# Patient Record
Sex: FEMALE | Race: WHITE | URBAN - METROPOLITAN AREA
[De-identification: names, ages, dates, MRNs, and addresses within clinical notes are randomized per-mention and may not be internally consistent; named-entity substitution may affect disease eponyms.]

---

## 2017-01-05 ENCOUNTER — RADIANT APPOINTMENT (OUTPATIENT)
Dept: GENERAL RADIOLOGY | Facility: CLINIC | Age: 42
End: 2017-01-05
Attending: PODIATRIST
Payer: COMMERCIAL

## 2017-01-05 ENCOUNTER — OFFICE VISIT (OUTPATIENT)
Dept: PODIATRY | Facility: CLINIC | Age: 42
End: 2017-01-05
Payer: COMMERCIAL

## 2017-01-05 VITALS — WEIGHT: 169 LBS | BODY MASS INDEX: 30.9 KG/M2

## 2017-01-05 DIAGNOSIS — S82.62XD CLOSED DISP FRACTURE OF LEFT LATERAL MALLEOLUS WITH ROUTINE HEALING: Primary | ICD-10-CM

## 2017-01-05 DIAGNOSIS — M25.572 ACUTE LEFT ANKLE PAIN: ICD-10-CM

## 2017-01-05 DIAGNOSIS — Z98.890 S/P FOOT SURGERY, LEFT: ICD-10-CM

## 2017-01-05 PROCEDURE — 99024 POSTOP FOLLOW-UP VISIT: CPT | Performed by: PODIATRIST

## 2017-01-05 PROCEDURE — 73610 X-RAY EXAM OF ANKLE: CPT | Mod: LT

## 2017-01-05 NOTE — NURSING NOTE
"Chief Complaint   Patient presents with     Surgical Followup     L ankle       Initial Wt 169 lb (76.658 kg)  Breastfeeding? No Estimated body mass index is 30.9 kg/(m^2) as calculated from the following:    Height as of 11/21/16: 5' 2\" (1.575 m).    Weight as of this encounter: 169 lb (76.658 kg).  BP completed using cuff size: NA (Not Taken)  Jaci Castle      "

## 2017-01-06 NOTE — PROGRESS NOTES
Katty returns to the office for reevaluation of the left ankle.  The patient relates following the instructions given at the last visit with noted less pain.  The patient relates overall more  improvement in pain and function of the left ankle.  The patient relates no other problems.    PAST MEDICAL HISTORY:   Past Medical History   Diagnosis Date     Moderate dysplasia of cervix (VIKY II) 2006     LEEP     Retinal detachment 4/2013     s/p trauma; rupture of globe, vitreous hemorrhage, choroidal effusion, hemorrhage anterior chamber of eye       BMI= Body mass index is 30.9 kg/(m^2).    Weight management plan: Patient was referred to their PCP to discuss a diet and exercise plan.    Physical Exam:    General: The patient appears to have a pleasant mental affect.    Lower extremity physical exam:  Neurovascular status is intact with palpable pedal pulses and intact epicritic sensations.  Muscular exam is within normal limits to major muscle groups.  Integument is intact.      One notes decreased edema.  One notes improved range of motion of the ankle joint on the left. No pain with palpation noted over the lateral malleolus.    Radiograph evaluation including weightbearing AP, lateral and medial oblique views of the left anklereveals interval healing with increased trabeculation of the lateral malleolus with hardware intact.    Assessment:      ICD-10-CM    1. Closed disp fracture of left lateral malleolus with routine healing S82.62XD    2. S/P foot surgery, left Z98.890        Plan:  I have explained to Katty about the conditions.  At this time, the patient may resume normal activity with no restrictions. The patient was instructed to return to the office of any problems arise.    Disclaimer: This note consists of symbols derived from keyboarding, dictation and/or voice recognition software. As a result, there may be errors in the script that have gone undetected. Please consider this when interpreting  information found in this chart.       SHERRI Aguilar D.P.M., AMY.PARIS.

## 2017-03-15 DIAGNOSIS — G43.009 MIGRAINE WITHOUT AURA AND WITHOUT STATUS MIGRAINOSUS, NOT INTRACTABLE: ICD-10-CM

## 2017-03-15 RX ORDER — RIZATRIPTAN BENZOATE 5 MG/1
5-10 TABLET ORAL
Qty: 6 TABLET | Refills: 0 | Status: SHIPPED | OUTPATIENT
Start: 2017-03-15 | End: 2017-10-12

## 2017-03-15 NOTE — TELEPHONE ENCOUNTER
Prescription approved per [facility] Refill Protocol.    Lindy Perkins, Pharm.D.  on behalf of [facility] Pharmacist   [email or web address]

## 2017-03-15 NOTE — TELEPHONE ENCOUNTER
Rizatriptan      Last Written Prescription Date: 10/07/2016  Last Fill Quantity: 6, # refills: 0  Last Office Visit with [facility], [facility] or [facility] prescribing provider: 01/05/2017       BP Readings from Last 3 Encounters:   10/11/16 96/73   10/07/16 116/72   10/03/16 112/78     Keely Mcgee [facility]  Float Technician  Fredo Guzmán

## 2017-04-20 ENCOUNTER — OFFICE VISIT (OUTPATIENT)
Dept: FAMILY MEDICINE | Facility: CLINIC | Age: 42
End: 2017-04-20
Payer: COMMERCIAL

## 2017-04-20 VITALS
SYSTOLIC BLOOD PRESSURE: 96 MMHG | BODY MASS INDEX: 29.95 KG/M2 | DIASTOLIC BLOOD PRESSURE: 70 MMHG | WEIGHT: 169 LBS | TEMPERATURE: 98.6 F | HEART RATE: 92 BPM | RESPIRATION RATE: 14 BRPM | HEIGHT: 63 IN

## 2017-04-20 DIAGNOSIS — G89.29 CHRONIC PAIN OF LEFT ANKLE: ICD-10-CM

## 2017-04-20 DIAGNOSIS — Z00.00 ROUTINE GENERAL MEDICAL EXAMINATION AT A HEALTH CARE FACILITY: Primary | ICD-10-CM

## 2017-04-20 DIAGNOSIS — F17.200 TOBACCO USE DISORDER: ICD-10-CM

## 2017-04-20 DIAGNOSIS — E55.9 VITAMIN D DEFICIENCY: ICD-10-CM

## 2017-04-20 DIAGNOSIS — E83.19 IRON EXCESS: ICD-10-CM

## 2017-04-20 DIAGNOSIS — M25.572 CHRONIC PAIN OF LEFT ANKLE: ICD-10-CM

## 2017-04-20 DIAGNOSIS — Z13.6 CARDIOVASCULAR SCREENING; LDL GOAL LESS THAN 160: ICD-10-CM

## 2017-04-20 LAB
ANION GAP SERPL CALCULATED.3IONS-SCNC: 8 MMOL/L (ref 3–14)
BUN SERPL-MCNC: 9 MG/DL (ref 7–30)
CALCIUM SERPL-MCNC: 8.6 MG/DL (ref 8.5–10.1)
CHLORIDE SERPL-SCNC: 108 MMOL/L (ref 94–109)
CHOLEST SERPL-MCNC: 203 MG/DL
CO2 SERPL-SCNC: 23 MMOL/L (ref 20–32)
CREAT SERPL-MCNC: 0.65 MG/DL (ref 0.52–1.04)
DEPRECATED CALCIDIOL+CALCIFEROL SERPL-MC: 21 UG/L (ref 20–75)
FERRITIN SERPL-MCNC: 108 NG/ML (ref 12–150)
GFR SERPL CREATININE-BSD FRML MDRD: NORMAL ML/MIN/1.7M2
GLUCOSE SERPL-MCNC: 96 MG/DL (ref 70–99)
HDLC SERPL-MCNC: 63 MG/DL
LDLC SERPL CALC-MCNC: 120 MG/DL
NONHDLC SERPL-MCNC: 140 MG/DL
POTASSIUM SERPL-SCNC: 3.7 MMOL/L (ref 3.4–5.3)
SODIUM SERPL-SCNC: 139 MMOL/L (ref 133–144)
TRIGL SERPL-MCNC: 102 MG/DL

## 2017-04-20 PROCEDURE — 80048 BASIC METABOLIC PNL TOTAL CA: CPT | Performed by: NURSE PRACTITIONER

## 2017-04-20 PROCEDURE — 36415 COLL VENOUS BLD VENIPUNCTURE: CPT | Performed by: NURSE PRACTITIONER

## 2017-04-20 PROCEDURE — 80061 LIPID PANEL: CPT | Performed by: NURSE PRACTITIONER

## 2017-04-20 PROCEDURE — 99396 PREV VISIT EST AGE 40-64: CPT | Performed by: NURSE PRACTITIONER

## 2017-04-20 PROCEDURE — 82306 VITAMIN D 25 HYDROXY: CPT | Performed by: NURSE PRACTITIONER

## 2017-04-20 PROCEDURE — 82728 ASSAY OF FERRITIN: CPT | Performed by: NURSE PRACTITIONER

## 2017-04-20 PROCEDURE — 99213 OFFICE O/P EST LOW 20 MIN: CPT | Mod: 25 | Performed by: NURSE PRACTITIONER

## 2017-04-20 NOTE — NURSING NOTE
"Chief Complaint   Patient presents with     Physical       Initial BP 96/70 (BP Location: Left arm)  Pulse 92  Temp 98.6  F (37  C) (Tympanic)  Resp 14  Ht 5' 3\" (1.6 m)  Wt 169 lb (76.7 kg)  BMI 29.94 kg/m2 Estimated body mass index is 29.94 kg/(m^2) as calculated from the following:    Height as of this encounter: 5' 3\" (1.6 m).    Weight as of this encounter: 169 lb (76.7 kg).  Medication Reconciliation: complete    "

## 2017-04-20 NOTE — MR AVS SNAPSHOT
After Visit Summary   4/20/2017    Katty Dixon    MRN: [id number]           Patient Information     Date Of Birth          1975        Visit Information        Provider Department      4/20/2017 8:40 AM Juju Burger APRN [facility]        Today's Diagnoses     Routine general medical examination at a health care facility    -  1    Chronic pain of left ankle        Tobacco use disorder        CARDIOVASCULAR SCREENING; LDL GOAL LESS THAN 160        Vitamin D deficiency        Iron excess          Care Instructions      Preventive Health Recommendations  Female Ages 40 to 49    Yearly exam:     See your health care provider every year in order to  1. Review health changes.   2. Discuss preventive care.    3. Review your medicines if your doctor prescribed any.      Get a Pap test every three years (unless you have an abnormal result and your provider advises testing more often).      If you get Pap tests with HPV test, you only need to test every 5 years, unless you have an abnormal result. You do not need a Pap test if your uterus was removed (hysterectomy) and you have not had cancer.      You should be tested each year for STDs (sexually transmitted diseases), if you're at risk.       Ask your doctor if you should have a mammogram.      Have a colonoscopy (test for colon cancer) if someone in your family has had colon cancer or polyps before age 50.       Have a cholesterol test every 5 years.       Have a diabetes test (fasting glucose) after age 45. If you are at risk for diabetes, you should have this test every 3 years.    Shots: Get a flu shot each year. Get a tetanus shot every 10 years.     Nutrition:     Eat at least 5 servings of fruits and vegetables each day.    Eat whole-grain bread, whole-wheat pasta and brown rice instead of white grains and rice.    Talk to your provider about Calcium and Vitamin D.     Lifestyle    Exercise at least 150  minutes a week (an average of 30 minutes a day, 5 days a week). This will help you control your weight and prevent disease.    Limit alcohol to one drink per day.    No smoking.     Wear sunscreen to prevent skin cancer.    See your dentist every six months for an exam and cleaning.    The 10-year ASCVD risk score (Blanca SAUCEDA Jr, et al., 2013) is: 1.7%    Values used to calculate the score:      Age: 41 years      Sex: Female      Is Non- : No      Diabetic: No      Tobacco smoker: Yes      Systolic Blood Pressure: 96 mmHg      Is BP treated: No      HDL Cholesterol: 47 mg/dL      Total Cholesterol: 189 mg/dL     Smoke stopping.   You need to set the goal of smoke stopping.  Write your goal on a piece of paper,   I am GOING to quit smoking.  And then  At least 3 positive reasons why you are going to quit smoking.    Place this on the mirror in your bathroom and read it the first thing in the AM and the last thing before bed.  Have your goal posted in the kitchen or where ever you need.  Now read it, say it to yourself at least 8 times per day for  8 weeks.    After a few weeks your brain will start to believe this and it will force you to make the decision to smoke or not to smoke.   Your brain can't remember both  I am going to smoke and I am NOT going to smoke... You choose which one you want to do.     Now, on the 2nd piece of paper write down your rewards for no smoking for 1,2,3,4 weeks,  2,3,4,5,6,8,10 12 months.  Pick someone to give you your rewards so you are accountable to someone and they can also be your cheerleader, ( not a nagger).      Doing this should give you 80 % chance of obtaining your goal !!!     Good luck,      Please get an over the counter Vitamin D supplement of 2000 units. Take 2000- 4000 units during the fall/winter months and 1000 units during the spring/summer              Follow-ups after your visit        Additional Services     PODIATRY/FOOT & ANKLE SURGERY  REFERRAL       Your provider has referred you to: [facility]: [facility] [place] [phone]   [email or web address]    Please be aware that coverage of these services is subject to the terms and limitations of your health insurance plan.  Call member services at your health plan with any benefit or coverage questions.      Please bring the following to your appointment:  >>   Any x-rays, CTs or MRIs which have been performed.  Contact the facility where they were done to arrange for  prior to your scheduled appointment.    >>   List of current medications   >>   This referral request   >>   Any documents/labs given to you for this referral                  Who to contact     Normal or non-critical lab and imaging results will be communicated to you by [employer], letter or phone within 4 business days after the clinic has received the results. If you do not hear from us within 7 days, please contact the clinic through [employer] or phone. If you have a critical or abnormal lab result, we will notify you by phone as soon as possible.  Submit refill requests through [employer] or call your pharmacy and they will forward the refill request to us. Please allow 3 business days for your refill to be completed.          If you need to speak with a  for additional information , please call: [phone]           Additional Information About Your Visit        [employer] Information     [employer] gives you secure access to your electronic health record. If you see a primary care provider, you can also send messages to your care team and make appointments. If you have questions, please call your primary care clinic.  If you do not have a primary care provider, please call [phone] and they will assist you.        Care EveryWhere ID     This is your Care EveryWhere ID. This could be used by other organizations to access your [place] medical records  [id number]        Your Vitals  "Were     Pulse Temperature Respirations Height BMI (Body Mass Index)       92 98.6  F (37  C) (Tympanic) 14 5' 3\" (1.6 m) 29.94 kg/m2        Blood Pressure from Last 3 Encounters:   04/20/17 96/70   10/11/16 96/73   10/07/16 116/72    Weight from Last 3 Encounters:   04/20/17 169 lb (76.7 kg)   01/05/17 169 lb (76.7 kg)   11/21/16 163 lb (73.9 kg)              We Performed the Following     Basic metabolic panel  (Ca, Cl, CO2, Creat, Gluc, K, Na, BUN)     Ferritin     Lipid Profile with reflex to direct LDL     PODIATRY/FOOT & ANKLE SURGERY REFERRAL     Vitamin D Deficiency          Today's Medication Changes          These changes are accurate as of: 4/20/17  9:50 AM.  If you have any questions, ask your nurse or doctor.               Start taking these medicines.        Dose/Directions    nicotine 7 MG/24HR 24 hr patch   Commonly known as:  NICODERM CQ   Used for:  Tobacco use disorder        Dose:  1 patch   Place 1 patch onto the skin every 24 hours   Quantity:  30 patch   Refills:  2            Where to get your medicines      These medications were sent to [facility] [address] [address]     Phone:  [phone]     nicotine 7 MG/24HR 24 hr patch                Primary Care Provider Office Phone # Fax #    [facility]ANDER [facility] [phone]       [address] [address]        Thank you!     Thank you for choosing [facility]  for your care. Our goal is always to provide you with excellent care. Hearing back from our patients is one way we can continue to improve our services. Please take a few minutes to complete the written survey that you may receive in the mail after your visit with us. Thank you!             Your Updated Medication List - Protect others around you: Learn how to safely use, store and throw away your medicines at www.disposemymeds.org.    "       This list is accurate as of: 4/20/17  9:50 AM.  Always use your most recent med list.                   Brand Name Dispense Instructions for use    cetirizine 10 MG tablet    ZYRTEC    30 tablet    Take 1 tablet by mouth daily.       fluticasone 50 MCG/ACT spray    FLONASE     Spray 1 spray into both nostrils daily as needed for rhinitis or allergies       hydrOXYzine 25 MG tablet    ATARAX    60 tablet    Take 1 tablet (25 mg) by mouth every 6 hours as needed for itching (and nausea)       ibuprofen 200 MG tablet    ADVIL/MOTRIN     400 mg by mouth every four hours as needed       nicotine 7 MG/24HR 24 hr patch    NICODERM CQ    30 patch    Place 1 patch onto the skin every 24 hours       * order for DME     1 Units    Knee Walker Length of use: Three months       * order for DME     1 Units    Knee Walker Length of use: Three months       * order for DME     1 Device    CAM walker regular       rizatriptan 5 MG tablet    MAXALT    6 tablet    Take 1-2 tablets (5-10 mg) by mouth at onset of headache for migraine May repeat dose in 2 hours.  Do not exceed 30 mg in 24 hours       VITAMIN C PO      Take 1 tablet by mouth daily       vitamin D 1000 UNITS capsule      Take 1 capsule by mouth daily 1-2 doses per day       * Notice:  This list has 3 medication(s) that are the same as other medications prescribed for you. Read the directions carefully, and ask your doctor or other care provider to review them with you.

## 2017-04-20 NOTE — PATIENT INSTRUCTIONS
Preventive Health Recommendations  Female Ages 40 to 49    Yearly exam:     See your health care provider every year in order to  1. Review health changes.   2. Discuss preventive care.    3. Review your medicines if your doctor prescribed any.      Get a Pap test every three years (unless you have an abnormal result and your provider advises testing more often).      If you get Pap tests with HPV test, you only need to test every 5 years, unless you have an abnormal result. You do not need a Pap test if your uterus was removed (hysterectomy) and you have not had cancer.      You should be tested each year for STDs (sexually transmitted diseases), if you're at risk.       Ask your doctor if you should have a mammogram.      Have a colonoscopy (test for colon cancer) if someone in your family has had colon cancer or polyps before age 50.       Have a cholesterol test every 5 years.       Have a diabetes test (fasting glucose) after age 45. If you are at risk for diabetes, you should have this test every 3 years.    Shots: Get a flu shot each year. Get a tetanus shot every 10 years.     Nutrition:     Eat at least 5 servings of fruits and vegetables each day.    Eat whole-grain bread, whole-wheat pasta and brown rice instead of white grains and rice.    Talk to your provider about Calcium and Vitamin D.     Lifestyle    Exercise at least 150 minutes a week (an average of 30 minutes a day, 5 days a week). This will help you control your weight and prevent disease.    Limit alcohol to one drink per day.    No smoking.     Wear sunscreen to prevent skin cancer.    See your dentist every six months for an exam and cleaning.    The 10-year ASCVD risk score (Hoagland KOMAL Jr, et al., 2013) is: 1.7%    Values used to calculate the score:      Age: 41 years      Sex: Female      Is Non- : No      Diabetic: No      Tobacco smoker: Yes      Systolic Blood Pressure: 96 mmHg      Is BP treated: No      HDL  Cholesterol: 47 mg/dL      Total Cholesterol: 189 mg/dL     Smoke stopping.   You need to set the goal of smoke stopping.  Write your goal on a piece of paper,   I am GOING to quit smoking.  And then  At least 3 positive reasons why you are going to quit smoking.    Place this on the mirror in your bathroom and read it the first thing in the AM and the last thing before bed.  Have your goal posted in the kitchen or where ever you need.  Now read it, say it to yourself at least 8 times per day for  8 weeks.    After a few weeks your brain will start to believe this and it will force you to make the decision to smoke or not to smoke.   Your brain can't remember both  I am going to smoke and I am NOT going to smoke... You choose which one you want to do.     Now, on the 2nd piece of paper write down your rewards for no smoking for 1,2,3,4 weeks,  2,3,4,5,6,8,10 12 months.  Pick someone to give you your rewards so you are accountable to someone and they can also be your cheerleader, ( not a nagger).      Doing this should give you 80 % chance of obtaining your goal !!!     Good luck,      Please get an over the counter Vitamin D supplement of 2000 units. Take 2000- 4000 units during the fall/winter months and 1000 units during the spring/summer

## 2017-04-20 NOTE — PROGRESS NOTES
SUBJECTIVE:     CC: Katty Dixon is an 41 year old woman who presents for preventive health visit.     Healthy Habits:    Do you get at least three servings of calcium containing foods daily (dairy, green leafy vegetables, etc.)? no, taking calcium and/or vitamin D supplement: no    Amount of exercise or daily activities, outside of work: 0 day(s) per week    Problems taking medications regularly No    Medication side effects: No    Have you had an eye exam in the past two years? yes    Do you see a dentist twice per year? yes    Do you have sleep apnea, excessive snoring or daytime drowsiness?no    -Smoking Cessation; alternatives to Chantex  -Pt is fasting   -Paper work for FMLA     Does have Migraine headaches. Headaches increased after ankle fracture .   still having left ankle pain  Did have increase after braking her ankle .  Do to the pain ,  Being sedentary ,  Stress.  . Is now doing better,   Did have the surgery Oct  11, 2016.  Is still able to to feel the screws. And the ankle will still give out on her occasionally.  Can't  Lay the with left ankle touching the bed.     Today's PHQ-2 Score:   PHQ-2 ( 1999 Pf[employer]) 4/20/2017 4/21/2016   Q1: Little interest or pleasure in doing things 0 0   Q2: Feeling down, depressed or hopeless 0 0   PHQ-2 Score 0 0       Abuse: Current or Past(Physical, Sexual or Emotional)- No  Do you feel safe in your environment - Yes    Social History   Substance Use Topics     Smoking status: Current Every Day Smoker     Packs/day: 0.50     Years: 18.00     Types: Cigarettes     Smokeless tobacco: Never Used      Comment: working on quitting     Alcohol use Yes      Comment: 2-3 times per week     The patient does not drink >3 drinks per day nor >7 drinks per week.    Recent Labs   Lab Test  04/21/16   1215  04/21/15   1415  01/08/14   1218   CHOL  189  171  184   HDL  47*  47*  36*   LDL  130*  114  131*   TRIG  58  51  85   CHOLHDLRATIO   --   3.6  5.0   NHDL  142*   --     --        Reviewed orders with patient.  Reviewed health maintenance and updated orders accordingly - Yes    Mammo Decision Support:  Patient over age 50, mutual decision to screen reflected in health maintenance.    Pertinent mammograms are reviewed under the imaging tab.  History of abnormal Pap smear: NO - age 30-65 PAP every 5 years with negative HPV co-testing recommended    Reviewed and updated as needed this visit by clinical staff  Meds         Reviewed and updated as needed this visit by Provide    ROS:  C: NEGATIVE for fever, chills, change in weight  INTEGUMENTARY/SKIN: NEGATIVE for worrisome rashes, moles or lesions and POSITIVE for psoriasis that is improved with Gold Bond lotion   EYES: NEGATIVE for vision changes or irritation and POSITIVE for corrected vision and current with eye exam   ENT: NEGATIVE for ear, mouth and throat problems and current with dentist   R: NEGATIVE for significant cough or SOB  B: NEGATIVE for masses, tenderness or discharge  CV: NEGATIVE for chest pain, palpitations or peripheral edema  GI: NEGATIVE for nausea, abdominal pain, heartburn, or change in bowel habits   female: no unusual urinary symptoms, no unusual vaginal symptoms and no periods   MUSCULOSKELETAL:NEGATIVE for significant arthralgias or myalgia and POSITIVE  for joint pain left ankle pain continues after fracture and pinning.  Later left ankle is still sore,  Will give out   N: NEGATIVE for weakness, dizziness or paresthesias  NEURO: POSITIVE for HX headaches-migraine and has had slight increase do to inactivity   E: NEGATIVE for temperature intolerance, skin/hair changes  H: NEGATIVE for bleeding problems  PSYCHIATRIC: NEGATIVE for changes in mood or affect    Labs reviewed in EPIC  BP Readings from Last 3 Encounters:   04/20/17 96/70   10/11/16 96/73   10/07/16 116/72    Wt Readings from Last 3 Encounters:   04/20/17 169 lb (76.7 kg)   01/05/17 169 lb (76.7 kg)   11/21/16 163 lb (73.9 kg)                   Patient Active Problem List   Diagnosis     Tobacco use disorder     Hypertrophic and atrophic condition of skin     Sinus tarsi syndrome     Pes planovalgus     CARDIOVASCULAR SCREENING; LDL GOAL LESS THAN 160     24 hour contact handout given     Seasonal allergies     Contraception management     Vitamin D deficiency     Migraine     Tension headache     Iron excess     Traumatic blindness of right eye, sequela     Closed displaced fracture of lateral malleolus of left fibula     Ankle pain, left     Past Surgical History:   Procedure Laterality Date     INSERT INTRAUTERINE DEVICE  7/30/2007    out in  7/2012     LEEP TX, CERVICAL  6/2006    VIKY 2     OPEN REDUCTION INTERNAL FIXATION ANKLE Left 10/11/2016    Procedure: OPEN REDUCTION INTERNAL FIXATION ANKLE;  Surgeon: Krunal Aguilar DPM;  Location: [facility]     REPAIR LACERATION CORNEA  4/28/13    right       Social History   Substance Use Topics     Smoking status: Current Every Day Smoker     Packs/day: 0.50     Years: 18.00     Types: Cigarettes     Smokeless tobacco: Never Used      Comment: working on quitting     Alcohol use Yes      Comment: 2-3 times per week     Family History   Problem Relation Age of Onset     Depression Father      CANCER Maternal Grandfather      lung     CANCER Paternal Grandmother      lung     C.A.D. No family hx of      DIABETES No family hx of      Hypertension No family hx of      CEREBROVASCULAR DISEASE No family hx of      Breast Cancer No family hx of      Cancer - colorectal No family hx of          Current Outpatient Prescriptions   Medication Sig Dispense Refill     nicotine (NICODERM CQ) 7 MG/24HR 24 hr patch Place 1 patch onto the skin every 24 hours 30 patch 2     rizatriptan (MAXALT) 5 MG tablet Take 1-2 tablets (5-10 mg) by mouth at onset of headache for migraine May repeat dose in 2 hours.  Do not exceed 30 mg in 24 hours 6 tablet 0     fluticasone (FLONASE) 50 MCG/ACT nasal spray Spray 1 spray into both  "nostrils daily as needed for rhinitis or allergies       order for DME Knee Walker  Length of use: Three months 1 Units 0     order for DME CAM walker regular 1 Device 0     hydrOXYzine (ATARAX) 25 MG tablet Take 1 tablet (25 mg) by mouth every 6 hours as needed for itching (and nausea) 60 tablet 0     order for DME Knee Walker  Length of use: Three months 1 Units 0     Ascorbic Acid (VITAMIN C PO) Take 1 tablet by mouth daily        Cholecalciferol (VITAMIN D) 1000 UNITS capsule Take 1 capsule by mouth daily 1-2 doses per day       cetirizine (ZYRTEC) 10 MG tablet Take 1 tablet by mouth daily. 30 tablet 5     IBUPROFEN 200 MG OR TABS 400 mg by mouth every four hours as needed       Allergies   Allergen Reactions     Amoxicillin GI Disturbance     Biaxin [Clarithromycin] GI Disturbance     Hydromorphone GI Disturbance     severe vomiting     OBJECTIVE:     BP 96/70 (BP Location: Left arm)  Pulse 92  Temp 98.6  F (37  C) (Tympanic)  Resp 14  Ht 5' 3\" (1.6 m)  Wt 169 lb (76.7 kg)  BMI 29.94 kg/m2  EXAM:  GENERAL: healthy, alert and no distress  EYES: Eyes grossly normal to inspection, PERRL and conjunctivae and sclerae normal  HENT: ear canals and TM's normal, nose and mouth without ulcers or lesions  NECK: no adenopathy, no asymmetry, masses, or scars and thyroid normal to palpation  RESP: lungs clear to auscultation - no rales, rhonchi or wheezes  BREAST: normal without masses, tenderness or nipple discharge and no palpable axillary masses or adenopathy  CV: regular rate and rhythm, normal S1 S2, no S3 or S4, no murmur, click or rub, no peripheral edema and peripheral pulses strong  ABDOMEN: soft, nontender, no hepatosplenomegaly, no masses and bowel sounds normal   (female): deferred  MS: left ankle   Does have limited ROM with flexion and rotation. Lateral side movement    Does have normal flexion and medially l  SKIN: no suspicious lesions or rashes    NEURO: Normal strength and tone, mentation intact and " speech normal  PSYCH: mentation appears normal, affect normal/bright  LYMPH: no cervical, supraclavicular, axillary, or inguinal adenopathy    ASSESSMENT/PLAN:      ICD-10-CM    1. Routine general medical examination at a health care facility Z00.00 Basic metabolic panel  (Ca, Cl, CO2, Creat, Gluc, K, Na, BUN)     Lipid Profile with reflex to direct LDL   2. Chronic pain of left ankle M25.572 PODIATRY/FOOT & ANKLE SURGERY REFERRAL    G89.29    3. Tobacco use disorder F17.200 nicotine (NICODERM CQ) 7 MG/24HR 24 hr patch   4. CARDIOVASCULAR SCREENING; LDL GOAL LESS THAN 160 Z13.6 Basic metabolic panel  (Ca, Cl, CO2, Creat, Gluc, K, Na, BUN)     Lipid Profile with reflex to direct LDL   5. Vitamin D deficiency E55.9 Vitamin D Deficiency   6. Iron excess E83.19 Ferritin       Patient Instructions     Preventive Health Recommendations  Female Ages 40 to 49    Yearly exam:     See your health care provider every year in order to  1. Review health changes.   2. Discuss preventive care.    3. Review your medicines if your doctor prescribed any.      Get a Pap test every three years (unless you have an abnormal result and your provider advises testing more often).      If you get Pap tests with HPV test, you only need to test every 5 years, unless you have an abnormal result. You do not need a Pap test if your uterus was removed (hysterectomy) and you have not had cancer.      You should be tested each year for STDs (sexually transmitted diseases), if you're at risk.       Ask your doctor if you should have a mammogram.      Have a colonoscopy (test for colon cancer) if someone in your family has had colon cancer or polyps before age 50.       Have a cholesterol test every 5 years.       Have a diabetes test (fasting glucose) after age 45. If you are at risk for diabetes, you should have this test every 3 years.    Shots: Get a flu shot each year. Get a tetanus shot every 10 years.     Nutrition:     Eat at least 5 servings  of fruits and vegetables each day.    Eat whole-grain bread, whole-wheat pasta and brown rice instead of white grains and rice.    Talk to your provider about Calcium and Vitamin D.     Lifestyle    Exercise at least 150 minutes a week (an average of 30 minutes a day, 5 days a week). This will help you control your weight and prevent disease.    Limit alcohol to one drink per day.    No smoking.     Wear sunscreen to prevent skin cancer.    See your dentist every six months for an exam and cleaning.    The 10-year ASCVD risk score ([place] KOMAL Jr, et al., 2013) is: 1.7%    Values used to calculate the score:      Age: 41 years      Sex: Female      Is Non- : No      Diabetic: No      Tobacco smoker: Yes      Systolic Blood Pressure: 96 mmHg      Is BP treated: No      HDL Cholesterol: 47 mg/dL      Total Cholesterol: 189 mg/dL     Smoke stopping.   You need to set the goal of smoke stopping.  Write your goal on a piece of paper,   I am GOING to quit smoking.  And then  At least 3 positive reasons why you are going to quit smoking.    Place this on the mirror in your bathroom and read it the first thing in the AM and the last thing before bed.  Have your goal posted in the kitchen or where ever you need.  Now read it, say it to yourself at least 8 times per day for  8 weeks.    After a few weeks your brain will start to believe this and it will force you to make the decision to smoke or not to smoke.   Your brain can't remember both  I am going to smoke and I am NOT going to smoke... You choose which one you want to do.     Now, on the 2nd piece of paper write down your rewards for no smoking for 1,2,3,4 weeks,  2,3,4,5,6,8,10 12 months.  Pick someone to give you your rewards so you are accountable to someone and they can also be your cheerleader, ( not a nagger).      Doing this should give you 80 % chance of obtaining your goal !!!     Good luck,      Please get an over the counter Vitamin D  supplement of 2000 units. Take 2000- 4000 units during the fall/winter months and 1000 units during the spring/summer               Please get an over the counter Vitamin D supplement of 2000 units. Take 2000- 4000 units during the fall/winter months and 1000 units during the spring/summer  You need to set the goal of smoke stopping.   Please get an over the counter Vitamin D supplement of 2000 units. Take 2000- 4000 units during the fall/winter months and 1000 units during the spring/summer  Please get an over the counter Vitamin D supplement of 2000 units. Take 2000- 4000 units during the fall/winter months and 1000 units during the spring/summer  Write your goal on a piece of paper,   I am GOING to quit smoking.  And then  At least 3 positive reasons why you are going to quit smoking.    Place this on the mirror in your bathroom and read it the first thing in the AM and the last thing before bed.  Have your goal posted in the kitchen or where ever you need.  Now read it, say it to yourself at least 8 times per day for  8 weeks.    After a few weeks your brain will start to believe this and it will force you to make the decision to smoke or not to smoke.   Your brain can't remember both  I am going to smoke and I am NOT going to smoke... You choose which one you want to do.     Now, on the 2nd piece of paper write down your rewards for no smoking for 1,2,3,4 weeks,  2,3,4,5,6,8,10 12 months.  Pick someone to give you your rewards so you are accountable to someone and they can also be your cheerleader, ( not a nagger).      Doing this should give you 80 % chance of obtaining your goal !!!     Good luck,      Please get an over the counter Vitamin D supplement of 2000 units. Take 2000- 4000 units during the fall/winter months and 1000 units during the spring/summer                  COUNSELING:   Reviewed preventive health counseling, as reflected in patient instructions       Regular exercise       Healthy  "diet/nutrition         reports that she has been smoking Cigarettes.  She has a 9.00 pack-year smoking history. She has never used smokeless tobacco.  Tobacco Cessation Action Plan: Self help information given to patient  Estimated body mass index is 29.94 kg/(m^2) as calculated from the following:    Height as of this encounter: 5' 3\" (1.6 m).    Weight as of this encounter: 169 lb (76.7 kg).   Weight management plan: Discussed healthy diet and exercise guidelines and patient will follow up in 1 month in clinic to re-evaluate.    Counseling Resources:  ATP IV Guidelines  Pooled Cohorts Equation Calculator  Breast Cancer Risk Calculator  FRAX Risk Assessment  ICSI Preventive Guidelines  Dietary Guidelines for Americans, 2010  USDA's MyPlate  ASA Prophylaxis  Lung CA Screening    [facility] ROCA NP, APRN [facility]  "

## 2017-04-21 NOTE — PROGRESS NOTES
Katty,     I am helping to cover some of Juju's results since she is out of the office.     Your Vit D level is on the low end of normal. You need to increase the Vit D 3 to 3000 units daily and plan to recheck your Vitamin D level again in 3 months.    Please call or email with any additional questions or concerns  ZANDER Duarte CNP

## 2017-08-23 ENCOUNTER — HOSPITAL ENCOUNTER (EMERGENCY)
Facility: CLINIC | Age: 42
Discharge: HOME | End: 2017-08-23
Attending: PHYSICIAN ASSISTANT | Admitting: PHYSICIAN ASSISTANT
Payer: COMMERCIAL

## 2017-08-23 ENCOUNTER — APPOINTMENT (OUTPATIENT)
Dept: GENERAL RADIOLOGY | Facility: CLINIC | Age: 42
End: 2017-08-23
Attending: PHYSICIAN ASSISTANT
Payer: COMMERCIAL

## 2017-08-23 VITALS
DIASTOLIC BLOOD PRESSURE: 78 MMHG | BODY MASS INDEX: 30.36 KG/M2 | TEMPERATURE: 98.4 F | OXYGEN SATURATION: 98 % | SYSTOLIC BLOOD PRESSURE: 113 MMHG | HEIGHT: 62 IN | RESPIRATION RATE: 16 BRPM | WEIGHT: 165 LBS

## 2017-08-23 DIAGNOSIS — S40.022A CONTUSION OF LEFT ARM, INITIAL ENCOUNTER: ICD-10-CM
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
## 2017-08-23 DIAGNOSIS — S16.1XXA STRAIN OF NECK MUSCLE, INITIAL ENCOUNTER: ICD-10-CM

## 2017-08-23 PROCEDURE — 99213 OFFICE O/P EST LOW 20 MIN: CPT

## 2017-08-23 PROCEDURE — 73060 X-RAY EXAM OF HUMERUS: CPT | Mod: LT

## 2017-08-23 PROCEDURE — 99214 OFFICE O/P EST MOD 30 MIN: CPT | Performed by: PHYSICIAN ASSISTANT

## 2017-08-23 RX ORDER — HYDROCODONE BITARTRATE AND ACETAMINOPHEN 5; 325 MG/1; MG/1
1-2 TABLET ORAL EVERY 4 HOURS PRN
Qty: 15 TABLET | Refills: 0 | Status: SHIPPED | OUTPATIENT
Start: 2017-08-23 | End: 2018-07-19

## 2017-08-23 RX ORDER — CYCLOBENZAPRINE HCL 10 MG
5-10 TABLET ORAL 3 TIMES DAILY PRN
Qty: 30 TABLET | Refills: 1 | Status: SHIPPED | OUTPATIENT
Start: 2017-08-23 | End: 2018-04-06

## 2017-08-23 NOTE — DISCHARGE INSTRUCTIONS
Understanding Cervical Strain    There are 7 bones (vertebrae) in the neck that are part of the spine. These are called the cervical spine. Cervical strain is a medical term for neck pain. The neck has several layers of muscles. These are connected with tendons to the cervical spine and other bones. Neck pain is often the result of injury to these muscles and tendons.  Causes of cervical strain  Different types of stress on the neck can damage muscles and tendons (soft tissues) and cause cervical strain. Cervical tissues can be damaged by:    The neck being forced past its normal range of motion, such as in a car accident or sports injury    Constant, low-level stress, such as from poor posture or a poorly set-up workspace  Symptoms of cervical strain  These may include:    Neck pain or stiffness    Pain in the shoulders or upper back    Muscle spasms    Headache, often starting at the base of the neck    Irritability, difficulty concentrating, or sleeplessness  Treatment for cervical strain  This problem often gets better on its own. Treatments aim to reduce pain and inflammation and increase the range of motion of the neck. Possible treatments include:    Over-the-counter or prescription pain medicine. These help relieve pain and inflammation.    Stretching exercises to decrease neck stiffness.    Massage to decrease neck stiffness.    Cold or heat pack. These help reduce pain and swelling.  Call 911  Call emergency services right away if you have any of these:    Face drooping or numbness    Numbness or weakness, especially in the arms or on one side    Slurred speech or difficulty speaking    Blurred vision   When to call your healthcare provider  Call your healthcare provider right away if you have any of these:    Fever of 100.4 F (38 C) or higher, or as directed    Pain or stiffness that gets worse    Symptoms that don t get better, or get worse    Numbness, tingling, weakness or shooting pains into the  arms or legs    New symptoms  Date Last Reviewed: 3/10/2016    8901-0154 [employer]. [address] [place] [address], [place] [address]. All rights reserved. This information is not intended as a substitute for professional medical care. Always follow your healthcare professional's instructions.
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
## 2017-08-23 NOTE — ED AVS SNAPSHOT
[facility] Emergency Department    [address] [facility] [address] [address]    Phone:  [phone]    Fax:  [phone]                                       Katty Dixon   MRN: [id number]    Department:  [facility] Emergency Department   Date of Visit:  8/23/2017           After Visit Summary Signature Page     I have received my discharge instructions, and my questions have been answered. I have discussed any challenges I see with this plan with the nurse or doctor.    ..........................................................................................................................................  Patient/Patient Representative Signature      ..........................................................................................................................................  Patient Representative Print Name and Relationship to Patient    ..................................................               ................................................  Date                                            Time    ..........................................................................................................................................  Reviewed by Signature/Title    ...................................................              ..............................................  Date                                                            Time
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
## 2017-08-30 ASSESSMENT — ENCOUNTER SYMPTOMS
COLOR CHANGE: 0
COUGH: 0
SHORTNESS OF BREATH: 0
LIGHT-HEADEDNESS: 0
NECK PAIN: 1
DIARRHEA: 0
BACK PAIN: 1
NAUSEA: 0
VOMITING: 0
PHOTOPHOBIA: 0
WOUND: 1
HEMATURIA: 0
CHILLS: 0
WEAKNESS: 0
FEVER: 0
NUMBNESS: 0
HEADACHES: 1
DIZZINESS: 0
ABDOMINAL PAIN: 0

## 2017-10-12 ENCOUNTER — OFFICE VISIT (OUTPATIENT)
Dept: FAMILY MEDICINE | Facility: CLINIC | Age: 42
End: 2017-10-12
Payer: COMMERCIAL

## 2017-10-12 VITALS
TEMPERATURE: 98.6 F | BODY MASS INDEX: 31.09 KG/M2 | DIASTOLIC BLOOD PRESSURE: 78 MMHG | HEART RATE: 104 BPM | SYSTOLIC BLOOD PRESSURE: 100 MMHG | WEIGHT: 170 LBS

## 2017-10-12 DIAGNOSIS — R30.0 DYSURIA: Primary | ICD-10-CM

## 2017-10-12 DIAGNOSIS — R82.90 NONSPECIFIC FINDING ON EXAMINATION OF URINE: ICD-10-CM

## 2017-10-12 DIAGNOSIS — N30.01 ACUTE CYSTITIS WITH HEMATURIA: ICD-10-CM

## 2017-10-12 DIAGNOSIS — B37.31 CANDIDIASIS OF VAGINA: ICD-10-CM

## 2017-10-12 DIAGNOSIS — N76.0 BACTERIAL VAGINOSIS: ICD-10-CM

## 2017-10-12 DIAGNOSIS — B96.89 BACTERIAL VAGINOSIS: ICD-10-CM

## 2017-10-12 DIAGNOSIS — G43.009 MIGRAINE WITHOUT AURA AND WITHOUT STATUS MIGRAINOSUS, NOT INTRACTABLE: ICD-10-CM

## 2017-10-12 LAB
ALBUMIN UR-MCNC: 100 MG/DL
APPEARANCE UR: ABNORMAL
BACTERIA #/AREA URNS HPF: ABNORMAL /HPF
BILIRUB UR QL STRIP: ABNORMAL
COLOR UR AUTO: YELLOW
GLUCOSE UR STRIP-MCNC: NEGATIVE MG/DL
HGB UR QL STRIP: ABNORMAL
KETONES UR STRIP-MCNC: ABNORMAL MG/DL
LEUKOCYTE ESTERASE UR QL STRIP: ABNORMAL
NITRATE UR QL: POSITIVE
NON-SQ EPI CELLS #/AREA URNS LPF: ABNORMAL /LPF
PH UR STRIP: 6 PH (ref 5–7)
RBC #/AREA URNS AUTO: ABNORMAL /HPF
SOURCE: ABNORMAL
SP GR UR STRIP: >1.03 (ref 1–1.03)
SPECIMEN SOURCE: ABNORMAL
UROBILINOGEN UR STRIP-ACNC: 1 EU/DL (ref 0.2–1)
WBC #/AREA URNS AUTO: ABNORMAL /HPF
WET PREP SPEC: ABNORMAL

## 2017-10-12 PROCEDURE — 81001 URINALYSIS AUTO W/SCOPE: CPT | Performed by: NURSE PRACTITIONER

## 2017-10-12 PROCEDURE — 87591 N.GONORRHOEAE DNA AMP PROB: CPT | Performed by: NURSE PRACTITIONER

## 2017-10-12 PROCEDURE — 87491 CHLMYD TRACH DNA AMP PROBE: CPT | Performed by: NURSE PRACTITIONER

## 2017-10-12 PROCEDURE — 87210 SMEAR WET MOUNT SALINE/INK: CPT | Performed by: NURSE PRACTITIONER

## 2017-10-12 PROCEDURE — 99213 OFFICE O/P EST LOW 20 MIN: CPT | Performed by: NURSE PRACTITIONER

## 2017-10-12 PROCEDURE — 87186 SC STD MICRODIL/AGAR DIL: CPT | Performed by: NURSE PRACTITIONER

## 2017-10-12 PROCEDURE — 87086 URINE CULTURE/COLONY COUNT: CPT | Performed by: NURSE PRACTITIONER

## 2017-10-12 PROCEDURE — 87088 URINE BACTERIA CULTURE: CPT | Performed by: NURSE PRACTITIONER

## 2017-10-12 RX ORDER — METRONIDAZOLE 500 MG/1
500 TABLET ORAL 2 TIMES DAILY
Qty: 14 TABLET | Refills: 0 | Status: SHIPPED | OUTPATIENT
Start: 2017-10-12 | End: 2018-04-06

## 2017-10-12 RX ORDER — CIPROFLOXACIN 500 MG/1
500 TABLET, FILM COATED ORAL 2 TIMES DAILY
Qty: 14 TABLET | Refills: 0 | Status: SHIPPED | OUTPATIENT
Start: 2017-10-12 | End: 2018-04-06

## 2017-10-12 RX ORDER — RIZATRIPTAN BENZOATE 5 MG/1
5-10 TABLET ORAL
Qty: 6 TABLET | Refills: 0 | Status: SHIPPED | OUTPATIENT
Start: 2017-10-12 | End: 2018-04-17

## 2017-10-12 RX ORDER — FLUCONAZOLE 150 MG/1
150 TABLET ORAL ONCE
Qty: 1 TABLET | Refills: 0 | Status: SHIPPED | OUTPATIENT
Start: 2017-10-12 | End: 2017-10-12

## 2017-10-12 ASSESSMENT — ENCOUNTER SYMPTOMS
SHORTNESS OF BREATH: 0
DYSURIA: 1
LIGHT-HEADEDNESS: 0
FREQUENCY: 1
FATIGUE: 0
SORE THROAT: 0
EYE ITCHING: 0
NAUSEA: 0
HEADACHES: 1
DIAPHORESIS: 0
CHILLS: 0
EYE DISCHARGE: 0
COUGH: 0
CONSTIPATION: 0
DIZZINESS: 0
WHEEZING: 0
DIARRHEA: 0
FEVER: 0
HEMATURIA: 0
RHINORRHEA: 0
SINUS PRESSURE: 0
FLANK PAIN: 0

## 2017-10-12 NOTE — MR AVS SNAPSHOT
After Visit Summary   10/12/2017    Katty Dixon    MRN: [id number]           Patient Information     Date Of Birth          1975        Visit Information        Provider Department      10/12/2017 11:20 AM Krystal Polanco APRN [facility]        Today's Diagnoses     Dysuria    -  1    Migraine without aura and without status migrainosus, not intractable        Bacterial vaginosis        Acute cystitis with hematuria        Candidiasis of vagina           Follow-ups after your visit        Who to contact     Normal or non-critical lab and imaging results will be communicated to you by mobiManagehart, letter or phone within 4 business days after the clinic has received the results. If you do not hear from us within 7 days, please contact the clinic through mobiManagehart or phone. If you have a critical or abnormal lab result, we will notify you by phone as soon as possible.  Submit refill requests through [facility] or call your pharmacy and they will forward the refill request to us. Please allow 3 business days for your refill to be completed.          If you need to speak with a  for additional information , please call: [phone]           Additional Information About Your Visit        MyChart Information     [facility] gives you secure access to your electronic health record. If you see a primary care provider, you can also send messages to your care team and make appointments. If you have questions, please call your primary care clinic.  If you do not have a primary care provider, please call [phone] and they will assist you.        Care EveryWhere ID     This is your Care EveryWhere ID. This could be used by other organizations to access your [place] medical records  [id number]        Your Vitals Were     Pulse Temperature BMI (Body Mass Index)             104 98.6  F (37  C) (Tympanic) 31.09 kg/m2          Blood Pressure from Last 3 Encounters:    10/12/17 100/78   08/23/17 113/78   04/20/17 96/70    Weight from Last 3 Encounters:   10/12/17 170 lb (77.1 kg)   08/23/17 165 lb (74.8 kg)   04/20/17 169 lb (76.7 kg)              We Performed the Following     Chlamydia trachomatis PCR     Neisseria gonorrhoeae PCR     UA reflex to Microscopic and Culture     Urine Microscopic     Wet prep          Today's Medication Changes          These changes are accurate as of: 10/12/17 11:42 AM.  If you have any questions, ask your nurse or doctor.               Start taking these medicines.        Dose/Directions    ciprofloxacin 500 MG tablet   Commonly known as:  CIPRO   Used for:  Acute cystitis with hematuria   Started by:  Krystal Polanco APRN CNP        Dose:  500 mg   Take 1 tablet (500 mg) by mouth 2 times daily   Quantity:  14 tablet   Refills:  0       fluconazole 150 MG tablet   Commonly known as:  DIFLUCAN   Used for:  Candidiasis of vagina   Started by:  Krystal Polanco APRN CNP        Dose:  150 mg   Take 1 tablet (150 mg) by mouth once for 1 dose   Quantity:  1 tablet   Refills:  0       metroNIDAZOLE 500 MG tablet   Commonly known as:  FLAGYL   Used for:  Bacterial vaginosis   Started by:  Krystal Polanco APRN CNP        Dose:  500 mg   Take 1 tablet (500 mg) by mouth 2 times daily   Quantity:  14 tablet   Refills:  0            Where to get your medicines      These medications were sent to [facility] [place]  [place] - [address]  [address] [address]     Phone:  [phone]     ciprofloxacin 500 MG tablet    fluconazole 150 MG tablet    metroNIDAZOLE 500 MG tablet    rizatriptan 5 MG tablet                Primary Care Provider Office Phone # Fax #    ZANDER Sage [phone] [address]  [place] [address]        Equal Access to Services     SARITHA GUILLORY AH: Thee Edmondson, jacek perez, nini vázquez  dian mccormackcherelle medel'aan ah. So [facility] [phone].    ATENCIÓN: Si junaid santiago, tiene a anne disposición servicios gratuitos de asistencia lingüística. Jam al [phone].    We comply with applicable federal civil rights laws and [place] laws. We do not discriminate on the basis of race, color, national origin, age, disability, sex, sexual orientation, or gender identity.            Thank you!     Thank you for choosing [facility]  for your care. Our goal is always to provide you with excellent care. Hearing back from our patients is one way we can continue to improve our services. Please take a few minutes to complete the written survey that you may receive in the mail after your visit with us. Thank you!             Your Updated Medication List - Protect others around you: Learn how to safely use, store and throw away your medicines at www.disposemymeds.org.          This list is accurate as of: 10/12/17 11:42 AM.  Always use your most recent med list.                   Brand Name Dispense Instructions for use Diagnosis    cetirizine 10 MG tablet    ZYRTEC    30 tablet    Take 1 tablet by mouth daily.    Sting of hornets, wasps, and bees as the cause of poisoning and toxic reactions(E905.3)       ciprofloxacin 500 MG tablet    CIPRO    14 tablet    Take 1 tablet (500 mg) by mouth 2 times daily    Acute cystitis with hematuria       cyclobenzaprine 10 MG tablet    FLEXERIL    30 tablet    Take 0.5-1 tablets (5-10 mg) by mouth 3 times daily as needed for muscle spasms        fluconazole 150 MG tablet    DIFLUCAN    1 tablet    Take 1 tablet (150 mg) by mouth once for 1 dose    Candidiasis of vagina       fluticasone 50 MCG/ACT spray    FLONASE     Spray 1 spray into both nostrils daily as needed for rhinitis or allergies        HYDROcodone-acetaminophen 5-325 MG per tablet    NORCO    15 tablet    Take 1-2 tablets by mouth every 4 hours as needed for moderate to severe pain         hydrOXYzine 25 MG tablet    ATARAX    60 tablet    Take 1 tablet (25 mg) by mouth every 6 hours as needed for itching (and nausea)    Closed displaced fracture of lateral malleolus of left fibula, initial encounter       ibuprofen 200 MG tablet    ADVIL/MOTRIN     400 mg by mouth every four hours as needed        metroNIDAZOLE 500 MG tablet    FLAGYL    14 tablet    Take 1 tablet (500 mg) by mouth 2 times daily    Bacterial vaginosis       nicotine 7 MG/24HR 24 hr patch    NICODERM CQ    30 patch    Place 1 patch onto the skin every 24 hours    Tobacco use disorder       rizatriptan 5 MG tablet    MAXALT    6 tablet    Take 1-2 tablets (5-10 mg) by mouth at onset of headache for migraine May repeat dose in 2 hours.  Do not exceed 30 mg in 24 hours    Migraine without aura and without status migrainosus, not intractable       VITAMIN C PO      Take 1 tablet by mouth daily        vitamin D 1000 UNITS capsule      Take 1 capsule by mouth daily 1-2 doses per day
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
## 2017-10-13 LAB
BACTERIA SPEC CULT: ABNORMAL
C TRACH DNA SPEC QL NAA+PROBE: NEGATIVE
N GONORRHOEA DNA SPEC QL NAA+PROBE: NEGATIVE
SPECIMEN SOURCE: ABNORMAL
SPECIMEN SOURCE: NORMAL
SPECIMEN SOURCE: NORMAL

## 2017-10-13 NOTE — PROGRESS NOTES
Katty,    You are negative for sexually transmitted diseases. Please use condoms with every sexual encounter.    Please call or email with any additional questions or concerns  ZANDER Duarte CNP

## 2017-10-16 NOTE — PROGRESS NOTES
Your urine culture is positive. You are on the right antibiotic. Make sure to take the full course of the antibiotic. Please return 3 days after your antibiotic is finished to resubmit a urine sample to make sure that infection is gone.

## 2017-11-21 ENCOUNTER — ALLIED HEALTH/NURSE VISIT (OUTPATIENT)
Dept: NURSING | Facility: CLINIC | Age: 42
End: 2017-11-21
Payer: COMMERCIAL

## 2017-11-21 ENCOUNTER — OFFICE VISIT (OUTPATIENT)
Dept: FAMILY MEDICINE | Facility: CLINIC | Age: 42
End: 2017-11-21
Payer: COMMERCIAL

## 2017-11-21 DIAGNOSIS — N39.0 URINARY TRACT INFECTION WITHOUT HEMATURIA, SITE UNSPECIFIED: Primary | ICD-10-CM

## 2017-11-21 DIAGNOSIS — R30.0 DYSURIA: Primary | ICD-10-CM

## 2017-11-21 LAB
ALBUMIN UR-MCNC: NEGATIVE MG/DL
APPEARANCE UR: ABNORMAL
BACTERIA #/AREA URNS HPF: ABNORMAL /HPF
BILIRUB UR QL STRIP: NEGATIVE
COLOR UR AUTO: YELLOW
GLUCOSE UR STRIP-MCNC: NEGATIVE MG/DL
HGB UR QL STRIP: ABNORMAL
KETONES UR STRIP-MCNC: NEGATIVE MG/DL
LEUKOCYTE ESTERASE UR QL STRIP: ABNORMAL
NITRATE UR QL: NEGATIVE
NON-SQ EPI CELLS #/AREA URNS LPF: ABNORMAL /LPF
PH UR STRIP: 6 PH (ref 5–7)
RBC #/AREA URNS AUTO: ABNORMAL /HPF
SOURCE: ABNORMAL
SP GR UR STRIP: 1.02 (ref 1–1.03)
UROBILINOGEN UR STRIP-ACNC: 1 EU/DL (ref 0.2–1)
WBC #/AREA URNS AUTO: ABNORMAL /HPF

## 2017-11-21 PROCEDURE — 99213 OFFICE O/P EST LOW 20 MIN: CPT | Performed by: FAMILY MEDICINE

## 2017-11-21 PROCEDURE — 81001 URINALYSIS AUTO W/SCOPE: CPT | Performed by: FAMILY MEDICINE

## 2017-11-21 PROCEDURE — 87088 URINE BACTERIA CULTURE: CPT | Performed by: FAMILY MEDICINE

## 2017-11-21 PROCEDURE — 87086 URINE CULTURE/COLONY COUNT: CPT | Performed by: FAMILY MEDICINE

## 2017-11-21 PROCEDURE — 87186 SC STD MICRODIL/AGAR DIL: CPT | Performed by: FAMILY MEDICINE

## 2017-11-21 PROCEDURE — 99207 ZZC NO CHARGE NURSE ONLY: CPT

## 2017-11-21 RX ORDER — CIPROFLOXACIN 500 MG/1
500 TABLET, FILM COATED ORAL 2 TIMES DAILY
Qty: 6 TABLET | Refills: 1 | Status: SHIPPED | OUTPATIENT
Start: 2017-11-21 | End: 2017-11-24

## 2017-11-21 NOTE — MR AVS SNAPSHOT
After Visit Summary   11/21/2017    Katty Dixon    MRN: [id number]           Patient Information     Date Of Birth          1975        Visit Information        Provider Department      11/21/2017 1:00 PM WILLIAM HUSSEIN RN [facility]        Today's Diagnoses     Dysuria    -  1       Follow-ups after your visit        Your next 10 appointments already scheduled     Nov 21, 2017  1:00 PM CST   [facility]    [address] [address]   [phone]              Who to contact     If you have questions or need follow up information about today's clinic visit or your schedule please contact [facility] directly at [phone].  Normal or non-critical lab and imaging results will be communicated to you by MyChart, letter or phone within 4 business days after the clinic has received the results. If you do not hear from us within 7 days, please contact the clinic through MyChart or phone. If you have a critical or abnormal lab result, we will notify you by phone as soon as possible.  Submit refill requests through [facility] or call your pharmacy and they will forward the refill request to us. Please allow 3 business days for your refill to be completed.          Additional Information About Your Visit        MyChart Information     [facility] gives you secure access to your electronic health record. If you see a primary care provider, you can also send messages to your care team and make appointments. If you have questions, please call your primary care clinic.  If you do not have a primary care provider, please call [phone] and they will assist you.        Care EveryWhere ID     This is your Care EveryWhere ID. This could be used by other organizations to access your [place] medical records  [id number]         Blood Pressure from Last 3 Encounters:   10/12/17 100/78   08/23/17  113/78   04/20/17 96/70    Weight from Last 3 Encounters:   10/12/17 170 lb (77.1 kg)   08/23/17 165 lb (74.8 kg)   04/20/17 169 lb (76.7 kg)              Today, you had the following     No orders found for display       Primary Care Provider Office Phone # Fax #    Juju Burger, APRN [facility] [phone] [address] [place] [address]        Equal Access to Services     SARITHA GUILLORY : Hadii aad ku hadasho Soomaali, waaxda luqadaha, qaybta kaalmada adeegyada, waxay idiin hayaan adeeg kharash larenny . So [facility] [phone].    ATENCIÓN: Si habla español, tiene a anne disposición servicios gratuitos de asistencia lingüística. Llame al [phone].    We comply with applicable federal civil rights laws and [place] laws. We do not discriminate on the basis of race, color, national origin, age, disability, sex, sexual orientation, or gender identity.            Thank you!     Thank you for choosing [facility]  for your care. Our goal is always to provide you with excellent care. Hearing back from our patients is one way we can continue to improve our services. Please take a few minutes to complete the written survey that you may receive in the mail after your visit with us. Thank you!             Your Updated Medication List - Protect others around you: Learn how to safely use, store and throw away your medicines at www.disposemymeds.org.          This list is accurate as of: 11/21/17 11:59 AM.  Always use your most recent med list.                   Brand Name Dispense Instructions for use Diagnosis    cetirizine 10 MG tablet    ZYRTEC    30 tablet    Take 1 tablet by mouth daily.    Sting of hornets, wasps, and bees as the cause of poisoning and toxic reactions(E905.3)       ciprofloxacin 500 MG tablet    CIPRO    14 tablet    Take 1 tablet (500 mg) by mouth 2 times daily    Acute cystitis with hematuria       cyclobenzaprine 10 MG tablet    FLEXERIL    30 tablet     Take 0.5-1 tablets (5-10 mg) by mouth 3 times daily as needed for muscle spasms        fluticasone 50 MCG/ACT spray    FLONASE     Spray 1 spray into both nostrils daily as needed for rhinitis or allergies        HYDROcodone-acetaminophen 5-325 MG per tablet    NORCO    15 tablet    Take 1-2 tablets by mouth every 4 hours as needed for moderate to severe pain        hydrOXYzine 25 MG tablet    ATARAX    60 tablet    Take 1 tablet (25 mg) by mouth every 6 hours as needed for itching (and nausea)    Closed displaced fracture of lateral malleolus of left fibula, initial encounter       ibuprofen 200 MG tablet    ADVIL/MOTRIN     400 mg by mouth every four hours as needed        metroNIDAZOLE 500 MG tablet    FLAGYL    14 tablet    Take 1 tablet (500 mg) by mouth 2 times daily    Bacterial vaginosis       nicotine 7 MG/24HR 24 hr patch    NICODERM CQ    30 patch    Place 1 patch onto the skin every 24 hours    Tobacco use disorder       rizatriptan 5 MG tablet    MAXALT    6 tablet    Take 1-2 tablets (5-10 mg) by mouth at onset of headache for migraine May repeat dose in 2 hours.  Do not exceed 30 mg in 24 hours    Migraine without aura and without status migrainosus, not intractable       VITAMIN C PO      Take 1 tablet by mouth daily        vitamin D 1000 UNITS capsule      Take 1 capsule by mouth daily 1-2 doses per day

## 2017-11-21 NOTE — MR AVS SNAPSHOT
After Visit Summary   11/21/2017    Katty Dixon    MRN: [id number]           Patient Information     Date Of Birth          1975        Visit Information        Provider Department      11/21/2017 11:40 AM Katty Gibson MD [facility]        Today's Diagnoses     Urinary tract infection without hematuria, site unspecified    -  1       Follow-ups after your visit        Who to contact     Normal or non-critical lab and imaging results will be communicated to you by Ancerahart, letter or phone within 4 business days after the clinic has received the results. If you do not hear from us within 7 days, please contact the clinic through Ancerahart or phone. If you have a critical or abnormal lab result, we will notify you by phone as soon as possible.  Submit refill requests through Wordy or call your pharmacy and they will forward the refill request to us. Please allow 3 business days for your refill to be completed.          If you need to speak with a  for additional information , please call: [phone]           Additional Information About Your Visit        Wordy Information     Wordy gives you secure access to your electronic health record. If you see a primary care provider, you can also send messages to your care team and make appointments. If you have questions, please call your primary care clinic.  If you do not have a primary care provider, please call [phone] and they will assist you.        Care EveryWhere ID     This is your Care EveryWhere ID. This could be used by other organizations to access your [facility] medical records  [id number]         Blood Pressure from Last 3 Encounters:   10/12/17 100/78   08/23/17 113/78   04/20/17 96/70    Weight from Last 3 Encounters:   10/12/17 170 lb (77.1 kg)   08/23/17 165 lb (74.8 kg)   04/20/17 169 lb (76.7 kg)              We Performed the Following     UA reflex to Microscopic and Culture      Urine Culture Aerobic Bacterial     Urine Microscopic          Today's Medication Changes          These changes are accurate as of: 11/21/17 11:59 PM.  If you have any questions, ask your nurse or doctor.               These medicines have changed or have updated prescriptions.        Dose/Directions    * ciprofloxacin 500 MG tablet   Commonly known as:  CIPRO   This may have changed:  Another medication with the same name was added. Make sure you understand how and when to take each.   Used for:  Acute cystitis with hematuria   Changed by:  Krystal Polanco APRN CNP        Dose:  500 mg   Take 1 tablet (500 mg) by mouth 2 times daily   Quantity:  14 tablet   Refills:  0       * ciprofloxacin 500 MG tablet   Commonly known as:  CIPRO   This may have changed:  You were already taking a medication with the same name, and this prescription was added. Make sure you understand how and when to take each.   Used for:  Urinary tract infection without hematuria, site unspecified   Changed by:  Katty Gibson MD        Dose:  500 mg   Take 1 tablet (500 mg) by mouth 2 times daily for 3 days   Quantity:  6 tablet   Refills:  1       * Notice:  This list has 2 medication(s) that are the same as other medications prescribed for you. Read the directions carefully, and ask your doctor or other care provider to review them with you.         Where to get your medicines      These medications were sent to [facility] [address] [address]     Phone:  [phone]     ciprofloxacin 500 MG tablet                Primary Care Provider Office Phone # Fax #    ZANDER Sage [phone]       [address] [place] [id number]        Equal Access to Services     LD GUILLORY AH: Thee Edmondson, waoneilda luqadaha, qaybta kaalnini rueda. So [facility]  [phone].    ATENCIÓN: Si junaid santiago, tiene a anne disposición servicios gratuitos de asistencia lingüística. Jam vanegas [phone].    We comply with applicable federal civil rights laws and [place] laws. We do not discriminate on the basis of race, color, national origin, age, disability, sex, sexual orientation, or gender identity.            Thank you!     Thank you for choosing [facility]  for your care. Our goal is always to provide you with excellent care. Hearing back from our patients is one way we can continue to improve our services. Please take a few minutes to complete the written survey that you may receive in the mail after your visit with us. Thank you!             Your Updated Medication List - Protect others around you: Learn how to safely use, store and throw away your medicines at www.disposemymeds.org.          This list is accurate as of: 11/21/17 11:59 PM.  Always use your most recent med list.                   Brand Name Dispense Instructions for use Diagnosis    cetirizine 10 MG tablet    ZYRTEC    30 tablet    Take 1 tablet by mouth daily.    Sting of hornets, wasps, and bees as the cause of poisoning and toxic reactions(E905.3)       * ciprofloxacin 500 MG tablet    CIPRO    14 tablet    Take 1 tablet (500 mg) by mouth 2 times daily    Acute cystitis with hematuria       * ciprofloxacin 500 MG tablet    CIPRO    6 tablet    Take 1 tablet (500 mg) by mouth 2 times daily for 3 days    Urinary tract infection without hematuria, site unspecified       cyclobenzaprine 10 MG tablet    FLEXERIL    30 tablet    Take 0.5-1 tablets (5-10 mg) by mouth 3 times daily as needed for muscle spasms        fluticasone 50 MCG/ACT spray    FLONASE     Spray 1 spray into both nostrils daily as needed for rhinitis or allergies        HYDROcodone-acetaminophen 5-325 MG per tablet    NORCO    15 tablet    Take 1-2 tablets by mouth every 4 hours as needed for moderate to severe pain         hydrOXYzine 25 MG tablet    ATARAX    60 tablet    Take 1 tablet (25 mg) by mouth every 6 hours as needed for itching (and nausea)    Closed displaced fracture of lateral malleolus of left fibula, initial encounter       ibuprofen 200 MG tablet    ADVIL/MOTRIN     400 mg by mouth every four hours as needed        metroNIDAZOLE 500 MG tablet    FLAGYL    14 tablet    Take 1 tablet (500 mg) by mouth 2 times daily    Bacterial vaginosis       nicotine 7 MG/24HR 24 hr patch    NICODERM CQ    30 patch    Place 1 patch onto the skin every 24 hours    Tobacco use disorder       rizatriptan 5 MG tablet    MAXALT    6 tablet    Take 1-2 tablets (5-10 mg) by mouth at onset of headache for migraine May repeat dose in 2 hours.  Do not exceed 30 mg in 24 hours    Migraine without aura and without status migrainosus, not intractable       VITAMIN C PO      Take 1 tablet by mouth daily        vitamin D 1000 UNITS capsule      Take 1 capsule by mouth daily 1-2 doses per day        * Notice:  This list has 2 medication(s) that are the same as other medications prescribed for you. Read the directions carefully, and ask your doctor or other care provider to review them with you.

## 2017-11-21 NOTE — PROGRESS NOTES
Patient was at the pharmacy and wanted to see if she could be seen or at least leave a urine sample today. There are no available appointments available. Offered telephone visit, explained costs and she was ok with that.  Romy Montes RN      SUBJECTIVE:   Katty Dixon is a 42 year old female who presents to clinic today for the following health issues:    URINARY TRACT SYMPTOMS  Onset: 2days    Description:   Painful urination (Dysuria): YES  Blood in urine (Hematuria): NO  Delay in urine (Hesitency): YES    Intensity: moderate    Progression of Symptoms:  worsening    Accompanying Signs & Symptoms:  Fever/chills: no   Flank pain YES  Nausea and vomiting: no   Any vaginal symptoms: vaginal odor  Abdominal/Pelvic Pain: no     History:   History of frequent UTI's: YES  History of kidney stones: no   Sexually Active: YES  Possibility of pregnancy: No      Therapies Tried and outcome:  Increase fluid intake and OTC advil or tylenol     Appropriate for telephone visit. Spoke with Dr. Gibson and placed on his schedule for a T-visit. Patient left a urine sample.

## 2017-11-21 NOTE — PROGRESS NOTES
SUBJECTIVE:   Katty Dixon is a 42 year old female who presents to clinic today for the following health issues:      URINARY TRACT SYMPTOMS  Onset: 2 days    Description:   Painful urination (Dysuria): YES  Blood in urine (Hematuria): NO  Delay in urine (Hesitency): YES    Intensity: moderate    Progression of Symptoms:  worsening    Accompanying Signs & Symptoms:  Fever/chills: no   Flank pain YES  Nausea and vomiting: no   Any vaginal symptoms: vaginal odor  Abdominal/Pelvic Pain: no     History:   History of frequent UTI's: YES  History of kidney stones: no   Sexually Active: YES  Possibility of pregnancy: No      Therapies Tried and outcome:  Increase fluid intake and OTC advil or tylenol       ROS:  Constitutional, HEENT, cardiovascular, pulmonary, gi and gu systems are negative, except as otherwise noted.      This document serves as a record of the services and decisions personally performed and made by Katty Gibson MD. It was created on his behalf by Jennifer Phoenix, a trained medical scribe. The creation of this document is based the provider's statements to the medical scribe.  Jennifer Phoenix 12:15 PM November 21, 2017  OBJECTIVE:     There were no vitals taken for this visit.  There is no height or weight on file to calculate BMI.     GENERAL: Healthy, alert and no distress  ABD: Soft, non-tender, no CVA tenderness to percussion  PSYCH: Mentation appears normal, affect normal/bright      Results for orders placed or performed in visit on 11/21/17   UA reflex to Microscopic and Culture   Result Value Ref Range    Color Urine Yellow     Appearance Urine Slightly Cloudy     Glucose Urine Negative NEG^Negative mg/dL    Bilirubin Urine Negative NEG^Negative    Ketones Urine Negative NEG^Negative mg/dL    Specific Gravity Urine 1.020 1.003 - 1.035    Blood Urine Moderate (A) NEG^Negative    pH Urine 6.0 5.0 - 7.0 pH    Protein Albumin Urine Negative NEG^Negative mg/dL    Urobilinogen Urine 1.0 0.2 - 1.0  EU/dL    Nitrite Urine Negative NEG^Negative    Leukocyte Esterase Urine Large (A) NEG^Negative    Source Midstream Urine    Urine Microscopic   Result Value Ref Range    WBC Urine 25-50 (A) OTO2^O - 2 /HPF    RBC Urine 2-5 (A) OTO2^O - 2 /HPF    Squamous Epithelial /LPF Urine Few FEW^Few /LPF    Bacteria Urine Moderate (A) NEG^Negative /HPF       ASSESSMENT/PLAN:     (N39.0) UTI (urinary tract infection)  (primary encounter diagnosis)  Comment: Consistent with bacterial etiology, will treat with antibiotics.  Plan: UA reflex to Microscopic and Culture,         ciprofloxacin (CIPRO) 500 MG tablet, Urine         Microscopic - Follow-up if symptoms worsen or do not improve.    (R82.90) Nonspecific finding on examination of urine  Plan: Urine Culture Aerobic Bacterial      Patient will follow up PRN. Patient instructed to call with any questions or concerns.    There are no Patient Instructions on file for this visit.    The information in this document, created by a scribe for me, accurately reflects the services I personally performed and the decisions made by me. I have reviewed and approved this document for accuracy.  12:15 PM November 21, 2017    Katty Gibson MD  [facility]

## 2017-11-23 LAB
BACTERIA SPEC CULT: ABNORMAL
Lab: ABNORMAL
SPECIMEN SOURCE: ABNORMAL

## 2018-04-06 ENCOUNTER — OFFICE VISIT (OUTPATIENT)
Dept: FAMILY MEDICINE | Facility: CLINIC | Age: 43
End: 2018-04-06
Payer: COMMERCIAL

## 2018-04-06 VITALS
DIASTOLIC BLOOD PRESSURE: 76 MMHG | HEART RATE: 74 BPM | HEIGHT: 63 IN | WEIGHT: 173.2 LBS | BODY MASS INDEX: 30.69 KG/M2 | TEMPERATURE: 98.1 F | SYSTOLIC BLOOD PRESSURE: 118 MMHG | RESPIRATION RATE: 16 BRPM

## 2018-04-06 DIAGNOSIS — T83.32XA INTRAUTERINE CONTRACEPTIVE DEVICE THREADS LOST, INITIAL ENCOUNTER: Primary | ICD-10-CM

## 2018-04-06 LAB — BETA HCG QUAL IFA URINE: NEGATIVE

## 2018-04-06 PROCEDURE — 99213 OFFICE O/P EST LOW 20 MIN: CPT | Performed by: PHYSICIAN ASSISTANT

## 2018-04-06 PROCEDURE — 84703 CHORIONIC GONADOTROPIN ASSAY: CPT | Performed by: PHYSICIAN ASSISTANT

## 2018-04-06 NOTE — NURSING NOTE
"Chief Complaint   Patient presents with     remove and insert new mirena       Initial /76  Pulse 74  Temp 98.1  F (36.7  C) (Tympanic)  Resp 16  Ht 5' 2.75\" (1.594 m)  Wt 173 lb 3.2 oz (78.6 kg)  BMI 30.93 kg/m2 Estimated body mass index is 30.93 kg/(m^2) as calculated from the following:    Height as of this encounter: 5' 2.75\" (1.594 m).    Weight as of this encounter: 173 lb 3.2 oz (78.6 kg).  Medication Reconciliation: complete    "

## 2018-04-06 NOTE — PROGRESS NOTES
Katty Dixon is a 42 year old female who presents today requesting replacement of an intrauterine device.  She currently has the Mirena IUD (placed 9/27/12) and is tolerating it well with no complications.  No unprotected sex in the past month.   The patient meets and is agreeable to the following conditions:   She is parous.   She is not interested in conception in the near future.   She currently is in a stable, monogamous relationship.   There is no previous history of pelvic inflammatory disease.   There is no previous history of ectopic pregnancy.   She is willing to check monthly for the IUD string.   She is at least 8 weeks post-partum.   There is no history of unresolved abnormal uterine bleeding.   There is no history of an unresolved abnormal PAP smear.   She has no history of Ricci's disease or an allergy to copper (for ParaGard).   She has no history of diabetes, AIDS, leukemia, IV drug use or chronic steroid use.   She is willing to return annually for pelvic exams.   She is current on pap smear testing.   She denies the possibility of pregnancy.   The following risks were discussed with the patient:   Possibility of pregnancy and ectopic pregnancy.   Possibility of pelvic inflammatory disease, particularly with new partners.   Risk of uterine perforation or IUD expulsion.   Possibility of difficult removal.   Spotting or heavy bleeding.   Cramping, pain or infection during or after insertion.   The patient was given patient information on the IUD and the patient education brochure from the . The patient has given consent to proceed with placement of the IUD. A written consent form is present in the chart. She wishes to proceed.   PROCEDURE:   Type of IUD: Mirena  She is placed in a dorsal lithotomy potion and a pelvic exam is performed to determine the position of the uterus. The cervix is identified however unable to visualize IUD strins..    Procedure was ended and will check an US  and have her f/u with gyne for replacement of IUD.     Bambi Mac PA-C

## 2018-04-06 NOTE — MR AVS SNAPSHOT
After Visit Summary   4/6/2018    Katty Dixon    MRN: [id number]           Patient Information     Date Of Birth          1975        Visit Information        Provider Department      4/6/2018 11:00 AM Bambi Mac PA-C [facility]        Today's Diagnoses     Encounter for insertion of mirena IUD    -  1    Intrauterine contraceptive device threads lost, initial encounter          Care Instructions    Please call [facility] Scheduling at [phone]  to set up the US and then f/u with a gynecologist for IUD replacement.            Follow-ups after your visit        Additional Services     OB/GYN REFERRAL       Your provider has referred you to:  [facility]: [facility] [phone]   [email or web address]  [facility]: [facility] [phone]   [email or web address]    Please be aware that coverage of these services is subject to the terms and limitations of your health insurance plan.  Call member services at your health plan with any benefit or coverage questions.      Please bring the following with you to your appointment:    (1) Any X-Rays, CTs or MRIs which have been performed.  Contact the facility where they were done to arrange for  prior to your scheduled appointment.   (2) List of current medications   (3) This referral request   (4) Any documents/labs given to you for this referral                  Your next 10 appointments already scheduled     Apr 09, 2018 11:40 AM CDT   SHORT with ZANDER Sage CNP   [facility] ([facility])    [address] [address]   [phone]              Future tests that were ordered for you today     Open Future Orders        Priority Expected Expires Ordered    US Pelvic Complete w Transvaginal Routine  4/6/2019 4/6/2018            Who to contact     Normal  "or non-critical lab and imaging results will be communicated to you by MyChart, letter or phone within 4 business days after the clinic has received the results. If you do not hear from us within 7 days, please contact the clinic through Owtwaret or phone. If you have a critical or abnormal lab result, we will notify you by phone as soon as possible.  Submit refill requests through [employer] or call your pharmacy and they will forward the refill request to us. Please allow 3 business days for your refill to be completed.          If you need to speak with a  for additional information , please call: [phone]           Additional Information About Your Visit        Pure[employer] Solutions Information     [employer] gives you secure access to your electronic health record. If you see a primary care provider, you can also send messages to your care team and make appointments. If you have questions, please call your primary care clinic.  If you do not have a primary care provider, please call [phone] and they will assist you.        Care EveryWhere ID     This is your Care EveryWhere ID. This could be used by other organizations to access your [facility] medical records  [id number]        Your Vitals Were     Pulse Temperature Respirations Height BMI (Body Mass Index)       74 98.1  F (36.7  C) (Tympanic) 16 5' 2.75\" (1.594 m) 30.93 kg/m2        Blood Pressure from Last 3 Encounters:   04/06/18 118/76   10/12/17 100/78   08/23/17 113/78    Weight from Last 3 Encounters:   04/06/18 173 lb 3.2 oz (78.6 kg)   10/12/17 170 lb (77.1 kg)   08/23/17 165 lb (74.8 kg)              We Performed the Following     Beta HCG qual IFA urine     OB/GYN REFERRAL        Primary Care Provider Office Phone # Fax #    ZANDER Sage [facility] [phone] [address] [place] [address]        Equal Access to Services     SARITHA GUILLORY AH: Hadii naga sharma Sosegun, waaxda luqadaha, qaybta kaalmada " nini mastcherelle sandovalaan ah. [facility] [phone].    ATENCIÓN: Si evelinala jack, tiene a anne disposición servicios gratuitos de asistencia lingüística. Jam al [phone].    We comply with applicable federal civil rights laws and [place] laws. We do not discriminate on the basis of race, color, national origin, age, disability, sex, sexual orientation, or gender identity.            Thank you!     Thank you for choosing [facility]  for your care. Our goal is always to provide you with excellent care. Hearing back from our patients is one way we can continue to improve our services. Please take a few minutes to complete the written survey that you may receive in the mail after your visit with us. Thank you!             Your Updated Medication List - Protect others around you: Learn how to safely use, store and throw away your medicines at www.disposemymeds.org.          This list is accurate as of 4/6/18 11:55 AM.  Always use your most recent med list.                   Brand Name Dispense Instructions for use Diagnosis    cetirizine 10 MG tablet    ZYRTEC    30 tablet    Take 1 tablet by mouth daily.    Sting of hornets, wasps, and bees as the cause of poisoning and toxic reactions(E905.3)       fluticasone 50 MCG/ACT spray    FLONASE     Spray 1 spray into both nostrils daily as needed for rhinitis or allergies        HYDROcodone-acetaminophen 5-325 MG per tablet    NORCO    15 tablet    Take 1-2 tablets by mouth every 4 hours as needed for moderate to severe pain        ibuprofen 200 MG tablet    ADVIL/MOTRIN     400 mg by mouth every four hours as needed        nicotine 7 MG/24HR 24 hr patch    NICODERM CQ    30 patch    Place 1 patch onto the skin every 24 hours    Tobacco use disorder       rizatriptan 5 MG tablet    MAXALT    6 tablet    Take 1-2 tablets (5-10 mg) by mouth at onset of headache for migraine May repeat dose in 2 hours.  Do not exceed 30 mg in 24  hours    Migraine without aura and without status migrainosus, not intractable       tiZANidine 4 MG tablet    ZANAFLEX          VITAMIN C PO      Take 1 tablet by mouth daily        vitamin D 1000 UNITS capsule      Take 1 capsule by mouth daily 1-2 doses per day

## 2018-04-06 NOTE — PATIENT INSTRUCTIONS
Please call [facility] Scheduling at [phone]  to set up the US and then f/u with a gynecologist for IUD replacement.

## 2018-04-12 ENCOUNTER — RADIANT APPOINTMENT (OUTPATIENT)
Dept: ULTRASOUND IMAGING | Facility: CLINIC | Age: 43
End: 2018-04-12
Attending: PHYSICIAN ASSISTANT
Payer: COMMERCIAL

## 2018-04-12 DIAGNOSIS — T83.32XA INTRAUTERINE CONTRACEPTIVE DEVICE THREADS LOST, INITIAL ENCOUNTER: ICD-10-CM

## 2018-04-12 PROCEDURE — 76830 TRANSVAGINAL US NON-OB: CPT

## 2018-04-12 PROCEDURE — 76856 US EXAM PELVIC COMPLETE: CPT

## 2018-04-12 NOTE — LETTER
April 17, 2018      Katty Dixon  [address] [place] [address]            Dear Cecily,     The ultrasound does show the IUD is in the uterus, I suggest you follow-up with a gynecologist for replacement. Please follow-up if you have any questions or concerns.       Resulted Orders   US Pelvic Complete w Transvaginal    Narrative    PELVIC ULTRASOUND WITH ENDOVAGINAL TRANSDUCER IMAGING   4/12/2018  12:01 PM     HISTORY: Intrauterine device strings lost. Intrauterine contraceptive  device threads lost, initial encounter.    TECHNIQUE:  Endovaginal sonography was added to the transabdominal  exam to better evaluate the uterus and ovaries.    COMPARISON: None.    FINDINGS: IUD identified at the mid to distal endometrium but is  difficult to visualize. The endometrial stripe is obscured. The uterus  measures 10.5 x 8.5 x 9 cm. There appears to be a distal uterine  fibroid that is 6.2 x 5.9 x 6.3 cm. The right ovary appears grossly  unremarkable but is only visualized at transabdominal scanning. The  left ovary contains a small cyst that is likely a functional cyst  measuring 1.8 cm.    No free fluid.      Impression    IMPRESSION:  1. IUD is identified, but it is difficult to visualize. It localizes  to the mid to distal endometrium.  2. Distal uterine fibroid.    JEFFREY SARMIENTO MD           Sincerely,     Bambi Mac PA-C

## 2018-04-17 ENCOUNTER — OFFICE VISIT (OUTPATIENT)
Dept: FAMILY MEDICINE | Facility: CLINIC | Age: 43
End: 2018-04-17
Payer: COMMERCIAL

## 2018-04-17 VITALS
SYSTOLIC BLOOD PRESSURE: 114 MMHG | WEIGHT: 175.2 LBS | TEMPERATURE: 98.6 F | DIASTOLIC BLOOD PRESSURE: 72 MMHG | HEART RATE: 92 BPM | RESPIRATION RATE: 18 BRPM | BODY MASS INDEX: 31.28 KG/M2

## 2018-04-17 DIAGNOSIS — G43.009 MIGRAINE WITHOUT AURA AND WITHOUT STATUS MIGRAINOSUS, NOT INTRACTABLE: ICD-10-CM

## 2018-04-17 PROCEDURE — 99213 OFFICE O/P EST LOW 20 MIN: CPT | Performed by: NURSE PRACTITIONER

## 2018-04-17 RX ORDER — RIZATRIPTAN BENZOATE 5 MG/1
5-10 TABLET ORAL
Qty: 6 TABLET | Refills: 1 | Status: SHIPPED | OUTPATIENT
Start: 2018-04-17

## 2018-04-17 ASSESSMENT — PAIN SCALES - GENERAL: PAINLEVEL: MILD PAIN (2)
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
## 2018-04-17 NOTE — MR AVS SNAPSHOT
After Visit Summary   4/17/2018    Katty Dixon    MRN: [id number]           Patient Information     Date Of Birth          1975        Visit Information        Provider Department      4/17/2018 10:20 AM Juju Burger APRN [facility]        Today's Diagnoses     Migraine without aura and without status migrainosus, not intractable          Care Instructions    For the headaches   You need   Regular  Exercise   sleep pattern and   Eating     Sleeping too long = headaches.     Skipping eating = headaches.     Regular exercise = decrease in headaches up to  60 %     On the nights when you are wanting to go to bed at 1 am vs your regular 3 am Tell yourself that you are going to fall a sleep,  Sleep all night and wake rested in the morning,  Tell this to yourself at least 8 times before going to bed.  Do your deep breathing  Breath in to the count of 3, hold your breath to the count of 3 and out to the count of 3.  Repeat this 2-3 times.    If you wake during the night repeat this.    Try getting a white noise machine.     You do need to stretch out the  Upper back /neck muscles daily   Stretch  -  Resist - stretch  Against resistance  Holding each position for 10 seconds.     When looking at your computer stay square with the computer !!    Move the chair. !!!               Follow-ups after your visit        Who to contact     Normal or non-critical lab and imaging results will be communicated to you by Wonderswamphart, letter or phone within 4 business days after the clinic has received the results. If you do not hear from us within 7 days, please contact the clinic through Wonderswamphart or phone. If you have a critical or abnormal lab result, we will notify you by phone as soon as possible.  Submit refill requests through NEXAGE or call your pharmacy and they will forward the refill request to us. Please allow 3 business days for your refill to be completed.          If you  need to speak with a  for additional information , please call: [phone]           Additional Information About Your Visit        RealtyAPXhart Information     Omiro gives you secure access to your electronic health record. If you see a primary care provider, you can also send messages to your care team and make appointments. If you have questions, please call your primary care clinic.  If you do not have a primary care provider, please call [phone] and they will assist you.        Care EveryWhere ID     This is your Care EveryWhere ID. This could be used by other organizations to access your [facility] medical records  [id number]        Your Vitals Were     Pulse Temperature Respirations BMI (Body Mass Index)          92 98.6  F (37  C) (Tympanic) 18 31.28 kg/m2         Blood Pressure from Last 3 Encounters:   04/17/18 114/72   04/06/18 118/76   10/12/17 100/78    Weight from Last 3 Encounters:   04/17/18 175 lb 3.2 oz (79.5 kg)   04/06/18 173 lb 3.2 oz (78.6 kg)   10/12/17 170 lb (77.1 kg)              Today, you had the following     No orders found for display         Where to get your medicines      These medications were sent to [facility] [address] [address]     Phone:  [phone]     rizatriptan 5 MG tablet          Primary Care Provider Office Phone # Fax #    Jujureymundo Burger, APRN [facility] [phone]       [address] [place] [address]        Equal Access to Services     SARITHA GUILLORY AH: Hadii aad ku hadasho Soomaali, waaxda luqadaha, qaybta kaalmada adeegyada, nini ang . So [facility] [phone].    ATENCIÓN: Si habla español, tiene a anne disposición servicios gratuitos de asistencia lingüística. Llame al [phone].    We comply with applicable federal civil rights laws and [place] laws. We do not discriminate on the basis of race, color, national  origin, age, disability, sex, sexual orientation, or gender identity.            Thank you!     Thank you for choosing [facility]  for your care. Our goal is always to provide you with excellent care. Hearing back from our patients is one way we can continue to improve our services. Please take a few minutes to complete the written survey that you may receive in the mail after your visit with us. Thank you!             Your Updated Medication List - Protect others around you: Learn how to safely use, store and throw away your medicines at www.disposemymeds.org.          This list is accurate as of 4/17/18 11:22 AM.  Always use your most recent med list.                   Brand Name Dispense Instructions for use Diagnosis    cetirizine 10 MG tablet    ZYRTEC    30 tablet    Take 1 tablet by mouth daily.    Sting of hornets, wasps, and bees as the cause of poisoning and toxic reactions(E905.3)       fluticasone 50 MCG/ACT spray    FLONASE     Spray 1 spray into both nostrils daily as needed for rhinitis or allergies        HYDROcodone-acetaminophen 5-325 MG per tablet    NORCO    15 tablet    Take 1-2 tablets by mouth every 4 hours as needed for moderate to severe pain        ibuprofen 200 MG tablet    ADVIL/MOTRIN     400 mg by mouth every four hours as needed        nicotine 7 MG/24HR 24 hr patch    NICODERM CQ    30 patch    Place 1 patch onto the skin every 24 hours    Tobacco use disorder       rizatriptan 5 MG tablet    MAXALT    6 tablet    Take 1-2 tablets (5-10 mg) by mouth at onset of headache for migraine May repeat dose in 2 hours.  Do not exceed 30 mg in 24 hours    Migraine without aura and without status migrainosus, not intractable       tiZANidine 4 MG tablet    ZANAFLEX          VITAMIN C PO      Take 1 tablet by mouth daily        vitamin D 1000 units capsule      Take 1 capsule by mouth daily 1-2 doses per day

## 2018-04-17 NOTE — PATIENT INSTRUCTIONS
For the headaches   You need   Regular  Exercise   sleep pattern and   Eating     Sleeping too long = headaches.     Skipping eating = headaches.     Regular exercise = decrease in headaches up to  60 %     On the nights when you are wanting to go to bed at 1 am vs your regular 3 am Tell yourself that you are going to fall a sleep,  Sleep all night and wake rested in the morning,  Tell this to yourself at least 8 times before going to bed.  Do your deep breathing  Breath in to the count of 3, hold your breath to the count of 3 and out to the count of 3.  Repeat this 2-3 times.    If you wake during the night repeat this.    Try getting a white noise machine.     You do need to stretch out the  Upper back /neck muscles daily   Stretch  -  Resist - stretch  Against resistance  Holding each position for 10 seconds.     When looking at your computer stay square with the computer !!    Move the chair. !!!

## 2018-04-17 NOTE — NURSING NOTE
"Chief Complaint   Patient presents with     Forms     FMLA       Initial /72 (BP Location: Left arm, Patient Position: Chair, Cuff Size: Adult Regular)  Pulse 92  Temp 98.6  F (37  C) (Tympanic)  Resp 18  Wt 175 lb 3.2 oz (79.5 kg)  BMI 31.28 kg/m2 Estimated body mass index is 31.28 kg/(m^2) as calculated from the following:    Height as of 4/6/18: 5' 2.75\" (1.594 m).    Weight as of this encounter: 175 lb 3.2 oz (79.5 kg).  Medication Reconciliation: complete     Deanna Liz CMA (AAMA)      "

## 2018-05-07 ENCOUNTER — OFFICE VISIT (OUTPATIENT)
Dept: OBGYN | Facility: CLINIC | Age: 43
End: 2018-05-07
Payer: COMMERCIAL

## 2018-05-07 VITALS
WEIGHT: 172 LBS | BODY MASS INDEX: 30.48 KG/M2 | HEART RATE: 113 BPM | DIASTOLIC BLOOD PRESSURE: 63 MMHG | RESPIRATION RATE: 16 BRPM | SYSTOLIC BLOOD PRESSURE: 109 MMHG | HEIGHT: 63 IN | TEMPERATURE: 98.7 F

## 2018-05-07 DIAGNOSIS — Z30.433 ENCOUNTER FOR REMOVAL AND REINSERTION OF INTRAUTERINE CONTRACEPTIVE DEVICE (IUD): Primary | ICD-10-CM

## 2018-05-07 DIAGNOSIS — Z30.430 ENCOUNTER FOR INSERTION OF MIRENA IUD: ICD-10-CM

## 2018-05-07 DIAGNOSIS — D25.9 UTERINE LEIOMYOMA, UNSPECIFIED LOCATION: ICD-10-CM

## 2018-05-07 PROCEDURE — 58300 INSERT INTRAUTERINE DEVICE: CPT | Performed by: OBSTETRICS & GYNECOLOGY

## 2018-05-07 PROCEDURE — 99202 OFFICE O/P NEW SF 15 MIN: CPT | Mod: 25 | Performed by: OBSTETRICS & GYNECOLOGY

## 2018-05-07 PROCEDURE — 58301 REMOVE INTRAUTERINE DEVICE: CPT | Performed by: OBSTETRICS & GYNECOLOGY

## 2018-05-07 NOTE — PROGRESS NOTES
"  HPI:  Katty is a 42 year old female here today for IUD re placement.  She went to her primary and they couldn't locate the strings.  She had an US which confirmed it.  She is happy with the Mirena IUD and doesn't have cycles with it.  She does get pelvic cramping every now and then.  No other complaints    Lab Results   Component Value Date    PAP NIL 2016     Past Medical History:   Diagnosis Date     Moderate dysplasia of cervix (VIKY II)     LEEP     Retinal detachment 2013    s/p trauma; rupture of globe, vitreous hemorrhage, choroidal effusion, hemorrhage anterior chamber of eye     Past Surgical History:   Procedure Laterality Date     INSERT INTRAUTERINE DEVICE  2007    out in  2012     LEEP TX, CERVICAL  2006    VIKY 2     OPEN REDUCTION INTERNAL FIXATION ANKLE Left 10/11/2016    Procedure: OPEN REDUCTION INTERNAL FIXATION ANKLE;  Surgeon: Krunal Aguilar DPM;  Location: [facility]     REPAIR LACERATION CORNEA  13    right   OB- x 3    meds-reviewed see EPIC  FH-reviewed see EPIC  Social history reviewed-see [facility]   ROS: 10 point ROS neg other than the symptoms noted above in the HPI.  UPT: negative  /63 (BP Location: Right arm, Patient Position: Chair, Cuff Size: Adult Regular)  Pulse 113  Temp 98.7  F (37.1  C) (Tympanic)  Resp 16  Ht 5' 2.75\" (1.594 m)  Wt 172 lb (78 kg)  BMI 30.71 kg/m2  Alert and orientedx3, in NAD\Pelvic exam: normal vagina and vulva, normal cervix without lesions or tenderness, uterus normal size anteverted but palpated a 6cm mass posterior to the uterus/tender, adenxa normal in size without tenderness, exam chaperoned by nurse.    PROCEDURE:  Placed in the lithotomy position.  Bimanual exam was done showing an anteverted uterus.     Speculum was placed, cervix was prepped with Betadine solution and a single-toothed tenaculum was placed on the anterior cervix.   IUD strings located and grasped with forceps and removed.  10ml 1% lidocaine " was instilled into the uterus. Uterus sounded to 8 cm.   Without difficulty, the IUD was placed through the cervical os into the uterus and released from the applicator.  Patient had no cramping during this part of the procedure.  The string was then trimmed to about two cm in length.  The tenaculum was removed and noted to have no bleeding.  The speculum was then removed.  I showed the patient the monofilament strings and explained how she should check for the strings on a monthly basis.    FINDINGS: IUD identified at the mid to distal endometrium but is  difficult to visualize. The endometrial stripe is obscured. The uterus  measures 10.5 x 8.5 x 9 cm. There appears to be a distal uterine  fibroid that is 6.2 x 5.9 x 6.3 cm. The right ovary appears grossly  unremarkable but is only visualized at transabdominal scanning. The  left ovary contains a small cyst that is likely a functional cyst  measuring 1.8 cm.     No free fluid.         IMPRESSION:  1. IUD is identified, but it is difficult to visualize. It localizes  to the mid to distal endometrium.  2. Distal uterine fibroid.   ASSESSMENT / PLAN:  (Z30.433) Encounter for removal and reinsertion of intrauterine contraceptive device (IUD)  (primary encounter diagnosis)  Comment: removed and replaced  Plan: HC LEVONORGESTREL IU 52MG 5 YR, levonorgestrel         (MIRENA) 20 MCG/24HR IUD, INSERTION         INTRAUTERINE DEVICE, REMOVE INTRAUTERINE DEVICE            (Z30.430) Mirena IUD- Remove 5/2023  Comment: done  Plan: HC LEVONORGESTREL IU 52MG 5 YR, levonorgestrel         (MIRENA) 20 MCG/24HR IUD, INSERTION         INTRAUTERINE DEVICE, REMOVE INTRAUTERINE DEVICE            (D25.9) Uterine leiomyoma, unspecified location  Comment: reviewed the US and in 2006 had a posterior intramural 2 cm fibroid.  Reviewed imaging and it appears it is now a 6cm posterior uterine fibroid.  Asymptomatic for the most part  Plan: given information.  Discussed that doesn't need to have  treated or removed unless she has concerns.  Questions answered    Camila Wong MD

## 2018-05-07 NOTE — MR AVS SNAPSHOT
"              After Visit Summary   5/7/2018    Katty Dixon    MRN: [id number]           Patient Information     Date Of Birth          1975        Visit Information        Provider Department      5/7/2018 4:15 PM Camila Wong MD [facility]        Today's Diagnoses     Encounter for removal and reinsertion of intrauterine contraceptive device (IUD)    -  1    Mirena IUD- Remove 5/2023        Uterine leiomyoma, unspecified location           Follow-ups after your visit        Who to contact     If you have questions or need follow up information about today's clinic visit or your schedule please contact [facility] directly at [phone].  Normal or non-critical lab and imaging results will be communicated to you by MyChart, letter or phone within 4 business days after the clinic has received the results. If you do not hear from us within 7 days, please contact the clinic through InstantLuxehart or phone. If you have a critical or abnormal lab result, we will notify you by phone as soon as possible.  Submit refill requests through Livelens or call your pharmacy and they will forward the refill request to us. Please allow 3 business days for your refill to be completed.          Additional Information About Your Visit        MyChart Information     Livelens gives you secure access to your electronic health record. If you see a primary care provider, you can also send messages to your care team and make appointments. If you have questions, please call your primary care clinic.  If you do not have a primary care provider, please call [phone] and they will assist you.        Care EveryWhere ID     This is your Care EveryWhere ID. This could be used by other organizations to access your [facility] medical records  [id number]        Your Vitals Were     Pulse Temperature Respirations Height BMI (Body Mass Index)       113 98.7  F (37.1  C) (Tympanic) 16 5' 2.75\" " (1.594 m) 30.71 kg/m2        Blood Pressure from Last 3 Encounters:   05/07/18 109/63   04/17/18 114/72   04/06/18 118/76    Weight from Last 3 Encounters:   05/07/18 172 lb (78 kg)   04/17/18 175 lb 3.2 oz (79.5 kg)   04/06/18 173 lb 3.2 oz (78.6 kg)              We Performed the Following     HC LEVONORGESTREL IU 52MG 5 YR     INSERTION INTRAUTERINE DEVICE     REMOVE INTRAUTERINE DEVICE          Today's Medication Changes          These changes are accurate as of 5/7/18  4:55 PM.  If you have any questions, ask your nurse or doctor.               Start taking these medicines.        Dose/Directions    levonorgestrel 20 MCG/24HR IUD   Commonly known as:  MIRENA   Used for:  Encounter for removal and reinsertion of intrauterine contraceptive device (IUD), Encounter for insertion of mirena IUD   Started by:  Camila Wong MD        Dose:  1 each   1 each (20 mcg) by Intrauterine route continuous   Refills:  0            Where to get your medicines      Some of these will need a paper prescription and others can be bought over the counter.  Ask your nurse if you have questions.     You don't need a prescription for these medications     levonorgestrel 20 MCG/24HR IUD                Primary Care Provider Office Phone # Fax #    Juju Burger, APRN [facility] [phone] [address] [place] [address]        Equal Access to Services     [facility] Hospital[facility] AH: Hadii naga ariaso Sosegun, waaxda luqadaha, qaybta kaalmada adecathyda, waxay dian calhoun. So [facility] [phone].    ATENCIÓN: Si habla español, tiene a anne disposición servicios gratuitos de asistencia lingüística. Llame al [phone].    We comply with applicable federal civil rights laws and [place] laws. We do not discriminate on the basis of race, color, national origin, age, disability, sex, sexual orientation, or gender identity.            Thank you!     Thank you for choosing [facility]  for your care. Our goal is always to provide you with excellent care. Hearing back from our patients is one way we can continue to improve our services. Please take a few minutes to complete the written survey that you may receive in the mail after your visit with us. Thank you!             Your Updated Medication List - Protect others around you: Learn how to safely use, store and throw away your medicines at www.disposemymeds.org.          This list is accurate as of 5/7/18  4:55 PM.  Always use your most recent med list.                   Brand Name Dispense Instructions for use Diagnosis    cetirizine 10 MG tablet    ZYRTEC    30 tablet    Take 1 tablet by mouth daily.    Sting of hornets, wasps, and bees as the cause of poisoning and toxic reactions(E905.3)       fluticasone 50 MCG/ACT spray    FLONASE     Spray 1 spray into both nostrils daily as needed for rhinitis or allergies        HYDROcodone-acetaminophen 5-325 MG per tablet    NORCO    15 tablet    Take 1-2 tablets by mouth every 4 hours as needed for moderate to severe pain        ibuprofen 200 MG tablet    ADVIL/MOTRIN     400 mg by mouth every four hours as needed        levonorgestrel 20 MCG/24HR IUD    MIRENA     1 each (20 mcg) by Intrauterine route continuous    Encounter for removal and reinsertion of intrauterine contraceptive device (IUD), Encounter for insertion of mirena IUD       nicotine 7 MG/24HR 24 hr patch    NICODERM CQ    30 patch    Place 1 patch onto the skin every 24 hours    Tobacco use disorder       rizatriptan 5 MG tablet    MAXALT    6 tablet    Take 1-2 tablets (5-10 mg) by mouth at onset of headache for migraine May repeat dose in 2 hours.  Do not exceed 30 mg in 24 hours    Migraine without aura and without status migrainosus, not intractable       tiZANidine 4 MG tablet    ZANAFLEX          VITAMIN C PO      Take 1 tablet by mouth daily        vitamin D 1000 units capsule      Take 1 capsule by mouth daily  1-2 doses per day

## 2018-07-19 ENCOUNTER — OFFICE VISIT (OUTPATIENT)
Dept: FAMILY MEDICINE | Facility: CLINIC | Age: 43
End: 2018-07-19
Payer: COMMERCIAL

## 2018-07-19 VITALS
BODY MASS INDEX: 30.65 KG/M2 | TEMPERATURE: 98.8 F | HEART RATE: 94 BPM | SYSTOLIC BLOOD PRESSURE: 110 MMHG | HEIGHT: 63 IN | DIASTOLIC BLOOD PRESSURE: 70 MMHG | WEIGHT: 173 LBS

## 2018-07-19 DIAGNOSIS — H11.441 CONJUNCTIVAL CYST, RIGHT: ICD-10-CM

## 2018-07-19 DIAGNOSIS — Z01.818 PREOP GENERAL PHYSICAL EXAM: Primary | ICD-10-CM

## 2018-07-19 LAB
ERYTHROCYTE [DISTWIDTH] IN BLOOD BY AUTOMATED COUNT: 12.7 % (ref 10–15)
HCT VFR BLD AUTO: 41.4 % (ref 35–47)
HGB BLD-MCNC: 13.8 G/DL (ref 11.7–15.7)
MCH RBC QN AUTO: 34.2 PG (ref 26.5–33)
MCHC RBC AUTO-ENTMCNC: 33.3 G/DL (ref 31.5–36.5)
MCV RBC AUTO: 103 FL (ref 78–100)
PLATELET # BLD AUTO: 313 10E9/L (ref 150–450)
RBC # BLD AUTO: 4.04 10E12/L (ref 3.8–5.2)
WBC # BLD AUTO: 9.7 10E9/L (ref 4–11)

## 2018-07-19 PROCEDURE — 99214 OFFICE O/P EST MOD 30 MIN: CPT | Performed by: FAMILY MEDICINE

## 2018-07-19 PROCEDURE — 36415 COLL VENOUS BLD VENIPUNCTURE: CPT | Performed by: FAMILY MEDICINE

## 2018-07-19 PROCEDURE — 85027 COMPLETE CBC AUTOMATED: CPT | Performed by: FAMILY MEDICINE

## 2018-07-19 NOTE — PROGRESS NOTES
[facility]  [address] [facility] [address]  [phone]  Dept: [phone]    PRE-OP EVALUATION:  Today's date: 2018    Katty Dixon (: 1975) presents for pre-operative evaluation assessment as requested by Dr. Tony.  She requires evaluation and anesthesia risk assessment prior to undergoing surgery/procedure for treatment of Conjunctival cyst of right eye  .    Proposed Surgery/ Procedure:Conjunctival cyst removal   Date of Surgery/ Procedure: 2018  Time of Surgery/ Procedure: unknown  Hospital/Surgical Facility: [facility]  Fax number for surgical facility:   Primary Physician: Juju Burger  Type of Anesthesia Anticipated: to be determined    Patient has a Health Care Directive or Living Will:  NO    1. NO - Do you have a history of heart attack, stroke, stent, bypass or surgery on an artery in the head, neck, heart or legs?  2. NO - Do you ever have any pain or discomfort in your chest?  3. NO - Do you have a history of  Heart Failure?  4. NO - Are you troubled by shortness of breath when: walking on the level, up a slight hill or at night?  5. NO - Do you currently have a cold, bronchitis or other respiratory infection?  6. NO - Do you have a cough, shortness of breath or wheezing?  7. NO - Do you sometimes get pains in the calves of your legs when you walk?  8. NO - Do you or anyone in your family have previous history of blood clots?  9. NO - Do you or does anyone in your family have a serious bleeding problem such as prolonged bleeding following surgeries or cuts?  10. YES - Have you ever had problems with anemia or been told to take iron pills? Only during pregnancy  11. NO - Have you had any abnormal blood loss such as black, tarry or bloody stools, or abnormal vaginal bleeding?  12. NO - Have you ever had a blood transfusion?  13. NO - Have you or any of your relatives ever had problems with anesthesia?  14. NO - Do  you have sleep apnea, excessive snoring or daytime drowsiness?  15. NO - Do you have any prosthetic heart valves?  16. NO - Do you have prosthetic joints?  17. NO - Is there any chance that you may be pregnant?      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  Had cyst removal in office in 10/2017 with recurrence.  Planned removal in OR upcoming      See problem list for active medical problems.  Problems all longstanding and stable, except as noted/documented.  See ROS for pertinent symptoms related to these conditions.                                                                                                                                                          .    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 Provider to review       Tension headache 05/07/2014     Priority: Medium     Vitamin D deficiency 01/08/2014     Priority: Medium     Problem list name updated by automated process. Provider to review       Contraception management 09/27/2012     Priority: Medium     Mirena IUD placed       Seasonal allergies 09/20/2012     Priority: Medium     24 hour contact handout given 05/16/2012     Priority: Medium     EMERGENCY CARE PLAN  Presenting Problem Signs and Symptoms Treatment Plan    Questions or conerns during clinic hours    I will call the clinic directly     Questions or conerns outside clinic hours    I will call the 24 hour nurse line at  [phone]    Patient needs to schedule an appointment    I will call the 24 hour scheduling team at [phone] or clinic directly    Same day treatment     I will call the clinic first, nurse line if after hours, urgent care and express care if needed                                 CARDIOVASCULAR SCREENING; LDL GOAL LESS THAN 160 10/31/2010     Priority: Medium     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 Provider to review        Past Medical History:   Diagnosis Date     Moderate dysplasia of cervix (VIKY II) 2006    LEEP     Retinal detachment 4/2013    s/p trauma; rupture of globe, vitreous hemorrhage, choroidal effusion, hemorrhage anterior chamber of eye     Past Surgical History:   Procedure Laterality Date     INSERT INTRAUTERINE DEVICE  7/30/2007    out in  7/2012     LEEP TX, CERVICAL  6/2006    VIKY 2     OPEN REDUCTION INTERNAL FIXATION ANKLE Left 10/11/2016    Procedure: OPEN REDUCTION INTERNAL FIXATION ANKLE;  Surgeon: Krunal Aguilar DPM;  Location: [facility] OR     REPAIR LACERATION CORNEA  4/28/13    right     Current Outpatient Prescriptions   Medication Sig Dispense Refill     Ascorbic Acid (VITAMIN C PO) Take 1 tablet by mouth daily        cetirizine (ZYRTEC) 10 MG tablet Take 1 tablet by mouth daily. 30 tablet 5     Cholecalciferol (VITAMIN D) 1000 UNITS capsule Take 1 capsule by mouth daily 1-2 doses per day       fluticasone (FLONASE) 50 MCG/ACT nasal spray Spray 1 spray into both nostrils daily as needed for rhinitis or allergies       IBUPROFEN 200 MG OR TABS 400 mg by mouth every four hours as needed       levonorgestrel (MIRENA) 20 MCG/24HR IUD 1 each (20 mcg) by Intrauterine route continuous       rizatriptan (MAXALT) 5 MG tablet Take  "1-2 tablets (5-10 mg) by mouth at onset of headache for migraine May repeat dose in 2 hours.  Do not exceed 30 mg in 24 hours 6 tablet 1     tiZANidine (ZANAFLEX) 4 MG tablet        nicotine (NICODERM CQ) 7 MG/24HR 24 hr patch Place 1 patch onto the skin every 24 hours (Patient not taking: Reported on 10/12/2017) 30 patch 2     OTC products: None, except as noted above    Allergies   Allergen Reactions     Amoxicillin GI Disturbance     Biaxin [Clarithromycin] GI Disturbance     Hydromorphone GI Disturbance     severe vomiting      Latex Allergy: NO    Social History   Substance Use Topics     Smoking status: Current Every Day Smoker     Packs/day: 0.50     Years: 18.00     Types: Cigarettes     Smokeless tobacco: Never Used      Comment: working on quitting     Alcohol use Yes      Comment: 2-3 times per week     History   Drug Use No       REVIEW OF SYSTEMS:   Constitutional, HEENT, cardiovascular, pulmonary, gi and gu systems are negative, except as otherwise noted.    EXAM:   /70 (BP Location: Right arm, Cuff Size: Adult Regular)  Pulse 94  Temp 98.8  F (37.1  C) (Tympanic)  Ht 5' 3\" (1.6 m)  Wt 173 lb (78.5 kg)  BMI 30.65 kg/m2    GENERAL APPEARANCE: healthy, alert and no distress     NECK: no adenopathy, no asymmetry, masses, or scars and thyroid normal to palpation     RESP: lungs clear to auscultation - no rales, rhonchi or wheezes     CV: regular rates and rhythm, normal S1 S2, no S3 or S4 and no murmur, click or rub     ABDOMEN:  soft, nontender, no HSM or masses and bowel sounds normal     MS: extremities normal- no gross deformities noted, no evidence of inflammation in joints, FROM in all extremities.     NEURO: Normal strength and tone, sensory exam grossly normal, mentation intact and speech normal     PSYCH: mentation appears normal. and affect normal/bright     LYMPHATICS: No cervical adenopathy    DIAGNOSTICS:     EKG: Not indicated due to non-vascular surgery and low risk of event (age " <65 and without cardiac risk factors)  Hemoglobin (indicated for history of anemia or procedure with significant blood loss such as tonsillectomy, major intraperitoneal surgery, vascular surgery, major spine surgery, total joint replacement)  Labs Resulted Today:       Recent Labs   Lab Test  04/20/17   0952  10/07/16   1359  04/21/16   1215   01/08/14   1218   11/20/12   1429   HGB   --   12.8   --    --   12.6   < >  13.5   PLT   --    --    --    --   307   --   296   NA  139   --   140   < >  141   --    --    POTASSIUM  3.7   --   4.1   < >  3.9   --    --    CR  0.65   --   0.62   < >  0.80   --    --     < > = values in this interval not displayed.        IMPRESSION:   Reason for surgery/procedure: recurrent conjunctival cyst R eye    The proposed surgical procedure is considered INTERMEDIATE risk.    REVISED CARDIAC RISK INDEX  The patient has the following serious cardiovascular risks for perioperative complications such as (MI, PE, VFib and 3  AV Block):  No serious cardiac risks  INTERPRETATION: 0 risks: Class I (very low risk - 0.4% complication rate)    The patient has the following additional risks for perioperative complications:  No identified additional risks      ICD-10-CM    1. Preop general physical exam Z01.818 CBC with platelets   2. Conjunctival cyst, right H11.441 CBC with platelets       RECOMMENDATIONS:         --Patient is to take all scheduled medications on the day of surgery EXCEPT for modifications listed below.    Anticoagulant or Antiplatelet Medication Use  Avoid for the week prior to surgery        APPROVAL GIVEN to proceed with proposed procedure, without further diagnostic evaluation       Signed Electronically by: Zoraida Cardozo DO    Copy of this evaluation report is provided to requesting physician.    [facility] Preop Guidelines    Revised Cardiac Risk Index

## 2018-07-19 NOTE — PATIENT INSTRUCTIONS
I would recommend avoiding your ibuprofen or additional NSAID's (like aleve or aspirin) for 1 week before your procedure.  Tylenol is okay if you need something for pain.    We'll do your blood count today and [facility].  We'll get everything faxed over today for surgery.    Good luck with your procedure  It was nice seeing you today!      Before Your Surgery      Call your surgeon if there is any change in your health. This includes signs of a cold or flu (such as a sore throat, runny nose, cough, rash or fever).    Do not smoke, drink alcohol or take over the counter medicine (unless your surgeon or primary care doctor tells you to) for the 24 hours before and after surgery.    If you take prescribed drugs: Follow your doctor s orders about which medicines to take and which to stop until after surgery.    Eating and drinking prior to surgery: follow the instructions from your surgeon    Take a shower or bath the night before surgery. Use the soap your surgeon gave you to gently clean your skin. If you do not have soap from your surgeon, use your regular soap. Do not shave or scrub the surgery site.  Wear clean pajamas and have clean sheets on your bed.

## 2018-07-19 NOTE — NURSING NOTE
"Initial /70 (BP Location: Right arm, Cuff Size: Adult Regular)  Pulse 94  Temp 98.8  F (37.1  C) (Tympanic)  Ht 5' 3\" (1.6 m)  Wt 173 lb (78.5 kg)  BMI 30.65 kg/m2 Estimated body mass index is 30.65 kg/(m^2) as calculated from the following:    Height as of this encounter: 5' 3\" (1.6 m).    Weight as of this encounter: 173 lb (78.5 kg). .    "

## 2018-07-19 NOTE — LETTER
July 24, 2018      Katty Dixon  [address] [place] [address]            Cecily,       Your blood count is consistent with your past results.  You should be all set!       Resulted Orders   CBC with platelets   Result Value Ref Range    WBC 9.7 4.0 - 11.0 10e9/L    RBC Count 4.04 3.8 - 5.2 10e12/L    Hemoglobin 13.8 11.7 - 15.7 g/dL    Hematocrit 41.4 35.0 - 47.0 %     (H) 78 - 100 fl    MCH 34.2 (H) 26.5 - 33.0 pg    MCHC 33.3 31.5 - 36.5 g/dL    RDW 12.7 10.0 - 15.0 %    Platelet Count 313 150 - 450 10e9/L       If you have any questions or concerns, please call the clinic at the number listed above.       Sincerely,        Zoraida Cardozo, DO/ag

## 2018-07-19 NOTE — MR AVS SNAPSHOT
After Visit Summary   7/19/2018    Katty Dixon    MRN: [id number]           Patient Information     Date Of Birth          1975        Visit Information        Provider Department      7/19/2018 9:40 AM Zoraida Cardozo DO [facility]        Today's Diagnoses     Preop general physical exam    -  1    Conjunctival cyst, right          Care Instructions    I would recommend avoiding your ibuprofen or additional NSAID's (like aleve or aspirin) for 1 week before your procedure.  Tylenol is okay if you need something for pain.    We'll do your blood count today and I'll send those results to your MyChart when available.  We'll get everything faxed over today for surgery.    Good luck with your procedure  It was nice seeing you today!      Before Your Surgery      Call your surgeon if there is any change in your health. This includes signs of a cold or flu (such as a sore throat, runny nose, cough, rash or fever).    Do not smoke, drink alcohol or take over the counter medicine (unless your surgeon or primary care doctor tells you to) for the 24 hours before and after surgery.    If you take prescribed drugs: Follow your doctor s orders about which medicines to take and which to stop until after surgery.    Eating and drinking prior to surgery: follow the instructions from your surgeon    Take a shower or bath the night before surgery. Use the soap your surgeon gave you to gently clean your skin. If you do not have soap from your surgeon, use your regular soap. Do not shave or scrub the surgery site.  Wear clean pajamas and have clean sheets on your bed.           Follow-ups after your visit        Who to contact     Normal or non-critical lab and imaging results will be communicated to you by MyChart, letter or phone within 4 business days after the clinic has received the results. If you do not hear from us within 7 days, please contact the clinic through [employer] or phone. If  "you have a critical or abnormal lab result, we will notify you by phone as soon as possible.  Submit refill requests through [employer] or call your pharmacy and they will forward the refill request to us. Please allow 3 business days for your refill to be completed.          If you need to speak with a  for additional information , please call: [phone]           Additional Information About Your Visit        [employer] Information     [employer] gives you secure access to your electronic health record. If you see a primary care provider, you can also send messages to your care team and make appointments. If you have questions, please call your primary care clinic.  If you do not have a primary care provider, please call [phone] and they will assist you.        Care EveryWhere ID     This is your Care EveryWhere ID. This could be used by other organizations to access your [facility] medical records  [id number]        Your Vitals Were     Pulse Temperature Height BMI (Body Mass Index)          94 98.8  F (37.1  C) (Tympanic) 5' 3\" (1.6 m) 30.65 kg/m2         Blood Pressure from Last 3 Encounters:   07/19/18 110/70   05/07/18 109/63   04/17/18 114/72    Weight from Last 3 Encounters:   07/19/18 173 lb (78.5 kg)   05/07/18 172 lb (78 kg)   04/17/18 175 lb 3.2 oz (79.5 kg)              We Performed the Following     CBC with platelets        Primary Care Provider Office Phone # Fax #    ZANDER Sage [facility] [phone] [address] [place] [address]        Equal Access to Services     [facility]: Hadii aad simona hadasho Sosegun, waaxda luqadaha, qaybta kaalmada rahulyada, nini ang . [facility] [phone].    ATENCIÓN: Si habla español, tiene a anne disposición servicios gratuitos de asistencia lingüística. Llame al [phone].    We comply with applicable federal civil rights laws and [place] laws. We do not discriminate on the " basis of race, color, national origin, age, disability, sex, sexual orientation, or gender identity.            Thank you!     Thank you for choosing [facility]  for your care. Our goal is always to provide you with excellent care. Hearing back from our patients is one way we can continue to improve our services. Please take a few minutes to complete the written survey that you may receive in the mail after your visit with us. Thank you!             Your Updated Medication List - Protect others around you: Learn how to safely use, store and throw away your medicines at www.disposemymeds.org.          This list is accurate as of 7/19/18 10:22 AM.  Always use your most recent med list.                   Brand Name Dispense Instructions for use Diagnosis    cetirizine 10 MG tablet    ZYRTEC    30 tablet    Take 1 tablet by mouth daily.    Sting of hornets, wasps, and bees as the cause of poisoning and toxic reactions(E905.3)       fluticasone 50 MCG/ACT spray    FLONASE     Spray 1 spray into both nostrils daily as needed for rhinitis or allergies        ibuprofen 200 MG tablet    ADVIL/MOTRIN     400 mg by mouth every four hours as needed        levonorgestrel 20 MCG/24HR IUD    MIRENA     1 each (20 mcg) by Intrauterine route continuous    Encounter for removal and reinsertion of intrauterine contraceptive device (IUD), Encounter for insertion of mirena IUD       nicotine 7 MG/24HR 24 hr patch    NICODERM CQ    30 patch    Place 1 patch onto the skin every 24 hours    Tobacco use disorder       rizatriptan 5 MG tablet    MAXALT    6 tablet    Take 1-2 tablets (5-10 mg) by mouth at onset of headache for migraine May repeat dose in 2 hours.  Do not exceed 30 mg in 24 hours    Migraine without aura and without status migrainosus, not intractable       tiZANidine 4 MG tablet    ZANAFLEX          VITAMIN C PO      Take 1 tablet by mouth daily        vitamin D 1000 units capsule      Take 1 capsule by  mouth daily 1-2 doses per day

## 2018-08-21 ENCOUNTER — TELEPHONE (OUTPATIENT)
Dept: FAMILY MEDICINE | Facility: CLINIC | Age: 43
End: 2018-08-21

## 2018-08-21 DIAGNOSIS — G47.00 INSOMNIA, UNSPECIFIED TYPE: Primary | ICD-10-CM

## 2018-08-21 RX ORDER — ZOLPIDEM TARTRATE 5 MG/1
5 TABLET ORAL
Qty: 14 TABLET | Refills: 0 | Status: SHIPPED | OUTPATIENT
Start: 2018-08-21

## 2018-08-21 NOTE — TELEPHONE ENCOUNTER
Patient called and said that when she was in to see Juju they talk about a sleep aid she never got one but now is thinking she would like to try one.    [place]

## 2019-04-29 ENCOUNTER — RESULT FOLLOW UP (OUTPATIENT)
Dept: FAMILY MEDICINE | Facility: CLINIC | Age: 44
End: 2019-04-29

## 2019-04-29 ENCOUNTER — OFFICE VISIT (OUTPATIENT)
Dept: FAMILY MEDICINE | Facility: CLINIC | Age: 44
End: 2019-04-29
Payer: COMMERCIAL

## 2019-04-29 VITALS
HEART RATE: 84 BPM | SYSTOLIC BLOOD PRESSURE: 110 MMHG | DIASTOLIC BLOOD PRESSURE: 70 MMHG | WEIGHT: 173.2 LBS | BODY MASS INDEX: 30.69 KG/M2 | HEIGHT: 63 IN | RESPIRATION RATE: 14 BRPM | TEMPERATURE: 98.4 F

## 2019-04-29 DIAGNOSIS — F17.200 TOBACCO DEPENDENCE SYNDROME: ICD-10-CM

## 2019-04-29 DIAGNOSIS — Z13.6 CARDIOVASCULAR SCREENING; LDL GOAL LESS THAN 160: ICD-10-CM

## 2019-04-29 DIAGNOSIS — Z00.00 ROUTINE GENERAL MEDICAL EXAMINATION AT A HEALTH CARE FACILITY: Primary | ICD-10-CM

## 2019-04-29 DIAGNOSIS — Z11.51 SCREENING FOR HUMAN PAPILLOMAVIRUS: ICD-10-CM

## 2019-04-29 DIAGNOSIS — R87.810 CERVICAL HIGH RISK HPV (HUMAN PAPILLOMAVIRUS) TEST POSITIVE: ICD-10-CM

## 2019-04-29 DIAGNOSIS — Z12.4 SCREENING FOR CERVICAL CANCER: ICD-10-CM

## 2019-04-29 DIAGNOSIS — M25.572 PAIN IN JOINT INVOLVING ANKLE AND FOOT, LEFT: ICD-10-CM

## 2019-04-29 LAB
ANION GAP SERPL CALCULATED.3IONS-SCNC: 5 MMOL/L (ref 3–14)
BUN SERPL-MCNC: 15 MG/DL (ref 7–30)
CALCIUM SERPL-MCNC: 8.9 MG/DL (ref 8.5–10.1)
CHLORIDE SERPL-SCNC: 108 MMOL/L (ref 94–109)
CHOLEST SERPL-MCNC: 195 MG/DL
CO2 SERPL-SCNC: 25 MMOL/L (ref 20–32)
CREAT SERPL-MCNC: 0.71 MG/DL (ref 0.52–1.04)
GFR SERPL CREATININE-BSD FRML MDRD: >90 ML/MIN/{1.73_M2}
GLUCOSE SERPL-MCNC: 91 MG/DL (ref 70–99)
HDLC SERPL-MCNC: 45 MG/DL
LDLC SERPL CALC-MCNC: 138 MG/DL
NONHDLC SERPL-MCNC: 150 MG/DL
POTASSIUM SERPL-SCNC: 3.9 MMOL/L (ref 3.4–5.3)
SODIUM SERPL-SCNC: 138 MMOL/L (ref 133–144)
TRIGL SERPL-MCNC: 62 MG/DL

## 2019-04-29 PROCEDURE — G0145 SCR C/V CYTO,THINLAYER,RESCR: HCPCS | Performed by: NURSE PRACTITIONER

## 2019-04-29 PROCEDURE — 80048 BASIC METABOLIC PNL TOTAL CA: CPT | Performed by: NURSE PRACTITIONER

## 2019-04-29 PROCEDURE — 99396 PREV VISIT EST AGE 40-64: CPT | Performed by: NURSE PRACTITIONER

## 2019-04-29 PROCEDURE — 80061 LIPID PANEL: CPT | Performed by: NURSE PRACTITIONER

## 2019-04-29 PROCEDURE — 36415 COLL VENOUS BLD VENIPUNCTURE: CPT | Performed by: NURSE PRACTITIONER

## 2019-04-29 PROCEDURE — 87624 HPV HI-RISK TYP POOLED RSLT: CPT | Performed by: NURSE PRACTITIONER

## 2019-04-29 ASSESSMENT — PAIN SCALES - GENERAL: PAINLEVEL: NO PAIN (0)

## 2019-04-29 ASSESSMENT — MIFFLIN-ST. JEOR: SCORE: 1396.5

## 2019-04-29 NOTE — PROGRESS NOTES
SUBJECTIVE:   CC: Katty Dixon is an 43 year old woman who presents for preventive health visit.     Healthy Habits:    Do you get at least three servings of calcium containing foods daily (dairy, green leafy vegetables, etc.)? no, taking calcium and/or vitamin D supplement: no    Amount of exercise or daily activities, outside of work: 7 day(s) per week    Problems taking medications regularly No    Medication side effects: No    Have you had an eye exam in the past two years? yes    Do you see a dentist twice per year? no    Do you have sleep apnea, excessive snoring or daytime drowsiness?no    *Is fasting. Does have FMLA forms to be completed.     *Will need a prescription for Chantix, but needs it sent to [facility]. Is currently smoking 6-10 cigarettes per day, has never tried Chantix, has used patches occasionally but is interested in the Chantix.    Today's PHQ-2 Score:   PHQ-2 ( 1999 Pf[employer]) 4/20/2017 4/21/2016   Q1: Little interest or pleasure in doing things 0 0   Q2: Feeling down, depressed or hopeless 0 0   PHQ-2 Score 0 0     Abuse: Current or Past(Physical, Sexual or Emotional)- No  Do you feel safe in your environment? Yes    Social History     Tobacco Use     Smoking status: Current Every Day Smoker     Packs/day: 0.50     Years: 18.00     Pack years: 9.00     Types: Cigarettes     Smokeless tobacco: Never Used     Tobacco comment: working on quitting   Substance Use Topics     Alcohol use: Yes     Comment: 2-3 times per week     If you drink alcohol do you typically have >3 drinks per day or >7 drinks per week? No                     Reviewed orders with patient.  Reviewed health maintenance and updated orders accordingly - Yes  Labs reviewed in EPIC    Mammogram Screening: Patient under age 50, mutual decision reflected in health maintenance.      Pertinent mammograms are reviewed under the imaging tab.  History of abnormal Pap smear: NO - age 30- 65 PAP every 3 years recommended  PAP  "/ HPV Latest Ref Rng & Units 4/21/2016 1/8/2014 9/20/2012   PAP - NIL NIL NIL   HPV 16 DNA NEG Negative - -   HPV 18 DNA NEG Negative - -   OTHER HR HPV NEG Negative - -     Reviewed and updated as needed this visit by clinical staff  Tobacco  Allergies  Meds  Med Hx  Surg Hx  Fam Hx  Soc Hx        Reviewed and updated as needed this visit by Provider  Tobacco  Allergies  Meds  Med Hx  Surg Hx  Fam Hx  Soc Hx           ROS:  CONSTITUTIONAL: NEGATIVE for fever, chills, change in weight  INTEGUMENTARY/SKIN: NEGATIVE for worrisome rashes, moles or lesions and does have rash with stress  EYES: NEGATIVE for vision changes or irritation and POSITIVE for corrected vision and current with eye exam    ENT: NEGATIVE for ear, mouth and throat problems and NOT current with dentist   RESP: NEGATIVE for significant cough or SOB,    Is smoking but will be using Chantix   BREAST: NEGATIVE for masses, tenderness or discharge  CV: NEGATIVE for chest pain, palpitations or peripheral edema  GI: NEGATIVE for nausea, abdominal pain, heartburn, or change in bowel habits  : NEGATIVE for unusual urinary or vaginal symptoms. Periods are regular.  MUSCULOSKELETAL: NEGATIVE for significant arthralgias or myalgia  NEURO: NEGATIVE for weakness, dizziness or paresthesias and POSITIVE for HX headaches-migraine  ENDOCRINE: NEGATIVE for temperature intolerance, skin/hair changes  HEME/ALLERGY/IMMUNE: NEGATIVE for bleeding problems  PSYCHIATRIC: NEGATIVE for changes in mood or affect and has had family issues.      OBJECTIVE:   /70 (BP Location: Right arm, Patient Position: Chair, Cuff Size: Adult Regular)   Pulse 84   Temp 98.4  F (36.9  C) (Oral)   Resp 14   Ht 1.595 m (5' 2.8\")   Wt 78.6 kg (173 lb 3.2 oz)   BMI 30.88 kg/m    EXAM:  GENERAL: healthy, alert and no distress  EYES: Eyes grossly normal to inspection and right eye is a glass eye   HENT: ear canals and TM's normal, nose and mouth without ulcers or " lesions  NECK: no adenopathy, no asymmetry, masses, or scars and thyroid normal to palpation  RESP: lungs clear to auscultation - no rales, rhonchi or wheezes  BREAST: normal without masses, tenderness or nipple discharge and no palpable axillary masses or adenopathy  CV: regular rate and rhythm, normal S1 S2, no S3 or S4, no murmur, click or rub, no peripheral edema and peripheral pulses strong  ABDOMEN: soft, nontender, no hepatosplenomegaly, no masses and bowel sounds normal   (female): normal female external genitalia, normal urethral meatus , vaginal mucosa pink, moist, well rugated and normal cervix, adnexae, and uterus without masses.  MS: no gross musculoskeletal defects noted, no edema  SKIN: no suspicious lesions or rashes  NEURO: Normal strength and tone, mentation intact and speech normal  PSYCH: mentation appears normal, affect normal/bright  LYMPH: no cervical, supraclavicular, axillary, or inguinal adenopathy    Diagnostic Test Results:  none     ASSESSMENT/PLAN:     ASSESSMENT/PLAN:      ICD-10-CM    1. Routine general medical examination at a health care facility Z00.00    2. Screening for cervical cancer Z12.4 Pap imaged thin layer screen with HPV - recommended age 30 - 65   3. Screening for human papillomavirus Z11.51 HPV High Risk Types DNA Cervical   4. CARDIOVASCULAR SCREENING; LDL GOAL LESS THAN 160 Z13.6 Lipid panel reflex to direct LDL Fasting     Basic metabolic panel  (Ca, Cl, CO2, Creat, Gluc, K, Na, BUN)   5. Tobacco dependence syndrome F17.200    6. Pain in joint involving ankle and foot, left M25.572 PODIATRY/FOOT & ANKLE SURGERY REFERRAL       Patient Instructions     Preventive Health Recommendations  Female Ages 40 to 49    Yearly exam:     See your health care provider every year in order to  1. Review health changes.   2. Discuss preventive care.    3. Review your medicines if your doctor prescribed any.      Get a Pap test every three years (unless you have an abnormal result  and your provider advises testing more often).      If you get Pap tests with HPV test, you only need to test every 5 years, unless you have an abnormal result. You do not need a Pap test if your uterus was removed (hysterectomy) and you have not had cancer.      You should be tested each year for STDs (sexually transmitted diseases), if you're at risk.     Ask your doctor if you should have a mammogram.      Have a colonoscopy (test for colon cancer) if someone in your family has had colon cancer or polyps before age 50.       Have a cholesterol test every 5 years.       Have a diabetes test (fasting glucose) after age 45. If you are at risk for diabetes, you should have this test every 3 years.    Shots: Get a flu shot each year. Get a tetanus shot every 10 years.     Nutrition:     Eat at least 5 servings of fruits and vegetables each day.    Eat whole-grain bread, whole-wheat pasta and brown rice instead of white grains and rice.    Get adequate Calcium and Vitamin D.      Lifestyle    Exercise at least 150 minutes a week (an average of 30 minutes a day, 5 days a week). This will help you control your weight and prevent disease.    Limit alcohol to one drink per day.    No smoking.     Wear sunscreen to prevent skin cancer.    See your dentist every six months for an exam and cleaning.    [facility]  Address to send the Chantix too    Smoke stopping.   You need to set the goal of smoke stopping.  Write your goal on a piece of paper,   I am GOING to quit smoking.  And then  At least 3 positive reasons why you are going to quit smoking.    Place this on the mirror in your bathroom and read it the first thing in the AM and the last thing before bed.  Have your goal posted in the kitchen or where ever you need.  Now read it, say it to yourself at least 8 times per day for  8 weeks.    After a few weeks your brain will start to believe this and it will force you to make the  "decision to smoke or not to smoke.   Your brain can't remember both  I am going to smoke and I am NOT going to smoke... You choose which one you want to do.     Now, on the 2nd piece of paper write down your rewards for no smoking for 1,2,3,4 weeks,  2,3,4,5,6,8,10 12 months.  Pick someone to give you your rewards so you are accountable to someone and they can also be your cheerleader, ( not a nagger).      Doing this should give you 80 % chance of obtaining your goal !!!     Good Juju sotelo APRN-CNP      Exercise  30-60 min daily      Stay well hydrated - urine should be clear.            COUNSELING:   Reviewed preventive health counseling, as reflected in patient instructions    BP Readings from Last 1 Encounters:   04/29/19 110/70     Estimated body mass index is 30.88 kg/m  as calculated from the following:    Height as of this encounter: 1.595 m (5' 2.8\").    Weight as of this encounter: 78.6 kg (173 lb 3.2 oz).      Weight management plan: Discussed healthy diet and exercise guidelines     reports that she has been smoking cigarettes.  She has a 9.00 pack-year smoking history. She has never used smokeless tobacco.  Tobacco Cessation Action Plan: Medication Therapy Management  (Referral to MTM)  Will start Chantix  - will call as what mail order pharmacy to send it      Counseling Resources:  ATP IV Guidelines  Pooled Cohorts Equation Calculator  Breast Cancer Risk Calculator  FRAX Risk Assessment  ICSI Preventive Guidelines  Dietary Guidelines for Americans, 2010  USDA's MyPlate  ASA Prophylaxis  Lung CA Screening    JUJU ROCA NP, APRN CNP  [facility]  "

## 2019-04-29 NOTE — PATIENT INSTRUCTIONS
Preventive Health Recommendations  Female Ages 40 to 49    Yearly exam:     See your health care provider every year in order to  1. Review health changes.   2. Discuss preventive care.    3. Review your medicines if your doctor prescribed any.      Get a Pap test every three years (unless you have an abnormal result and your provider advises testing more often).      If you get Pap tests with HPV test, you only need to test every 5 years, unless you have an abnormal result. You do not need a Pap test if your uterus was removed (hysterectomy) and you have not had cancer.      You should be tested each year for STDs (sexually transmitted diseases), if you're at risk.     Ask your doctor if you should have a mammogram.      Have a colonoscopy (test for colon cancer) if someone in your family has had colon cancer or polyps before age 50.       Have a cholesterol test every 5 years.       Have a diabetes test (fasting glucose) after age 45. If you are at risk for diabetes, you should have this test every 3 years.    Shots: Get a flu shot each year. Get a tetanus shot every 10 years.     Nutrition:     Eat at least 5 servings of fruits and vegetables each day.    Eat whole-grain bread, whole-wheat pasta and brown rice instead of white grains and rice.    Get adequate Calcium and Vitamin D.      Lifestyle    Exercise at least 150 minutes a week (an average of 30 minutes a day, 5 days a week). This will help you control your weight and prevent disease.    Limit alcohol to one drink per day.    No smoking.     Wear sunscreen to prevent skin cancer.    See your dentist every six months for an exam and cleaning.    Please find out the [facility]  Address to send the Chantix too    Smoke stopping.   You need to set the goal of smoke stopping.  Write your goal on a piece of paper,   I am GOING to quit smoking.  And then  At least 3 positive reasons why you are going to quit smoking.    Place this on the  mirror in your bathroom and read it the first thing in the AM and the last thing before bed.  Have your goal posted in the kitchen or where ever you need.  Now read it, say it to yourself at least 8 times per day for  8 weeks.    After a few weeks your brain will start to believe this and it will force you to make the decision to smoke or not to smoke.   Your brain can't remember both  I am going to smoke and I am NOT going to smoke... You choose which one you want to do.     Now, on the 2nd piece of paper write down your rewards for no smoking for 1,2,3,4 weeks,  2,3,4,5,6,8,10 12 months.  Pick someone to give you your rewards so you are accountable to someone and they can also be your cheerleader, ( not a nagger).      Doing this should give you 80 % chance of obtaining your goal !!!     Good Juju sotelo APRN-CNP      Exercise  30-60 min daily      Stay well hydrated - urine should be clear.

## 2019-04-29 NOTE — NURSING NOTE
"Initial /70 (BP Location: Right arm, Patient Position: Chair, Cuff Size: Adult Regular)   Pulse 84   Temp 98.4  F (36.9  C) (Oral)   Resp 14   Ht 1.595 m (5' 2.8\")   Wt 78.6 kg (173 lb 3.2 oz)   BMI 30.88 kg/m   Estimated body mass index is 30.88 kg/m  as calculated from the following:    Height as of this encounter: 1.595 m (5' 2.8\").    Weight as of this encounter: 78.6 kg (173 lb 3.2 oz). .    Deanna Liz CMA ([facility])    "

## 2019-05-02 LAB
COPATH REPORT: NORMAL
PAP: NORMAL

## 2019-05-03 LAB
FINAL DIAGNOSIS: ABNORMAL
HPV HR 12 DNA CVX QL NAA+PROBE: POSITIVE
HPV16 DNA SPEC QL NAA+PROBE: NEGATIVE
HPV18 DNA SPEC QL NAA+PROBE: NEGATIVE
SPECIMEN DESCRIPTION: ABNORMAL
SPECIMEN SOURCE CVX/VAG CYTO: ABNORMAL

## 2019-05-03 NOTE — PROGRESS NOTES
1/11/06 [facility] pap.  Colpo 1/26/06 with area of VIKY II and negative ECC.  Referred for LEEP  LEEP 06/06--VIKY 2, margins clear--Pap Q 4 months x 3  10/4/06:Pap--NIL  3/15/07:Pap--NIL  9/7/07:Pap--NIL  4/22/08:Pap--NIL  12/18/09:Pap--NIL  11/22/10:Pap--NIL  9/20/12:Pap--NIL  1/10/14:Pap--NIL  4/29/19 NIL pap, + HR HPV (not 16 or 18). Plan 1 year cotest (MRA) (juan diego)  05/10/19 LM for pt to call back (juan diego) Advised of result and follow up. (tali)

## 2019-05-09 ENCOUNTER — TELEPHONE (OUTPATIENT)
Dept: FAMILY MEDICINE | Facility: CLINIC | Age: 44
End: 2019-05-09

## 2019-05-09 DIAGNOSIS — F17.200 TOBACCO DEPENDENCE SYNDROME: Primary | ICD-10-CM

## 2019-05-09 NOTE — TELEPHONE ENCOUNTER
Patient brought in new forms to be completed for her FMLA, as discussed at her previous appointment. Patient also dropped of a correct number for [facility] to send a Chantix prescription, [phone]. Forms placed on Juju's desk 05/09/19. Please call patient when completed.    Deanna Liz CMA ([facility])

## 2019-05-14 ENCOUNTER — OFFICE VISIT (OUTPATIENT)
Dept: PODIATRY | Facility: CLINIC | Age: 44
End: 2019-05-14
Payer: COMMERCIAL

## 2019-05-14 ENCOUNTER — ANCILLARY PROCEDURE (OUTPATIENT)
Dept: GENERAL RADIOLOGY | Facility: CLINIC | Age: 44
End: 2019-05-14
Attending: PODIATRIST
Payer: COMMERCIAL

## 2019-05-14 VITALS
DIASTOLIC BLOOD PRESSURE: 80 MMHG | SYSTOLIC BLOOD PRESSURE: 110 MMHG | WEIGHT: 173 LBS | BODY MASS INDEX: 30.85 KG/M2 | HEART RATE: 100 BPM

## 2019-05-14 DIAGNOSIS — M19.079 ARTHRITIS OF ANKLE: Primary | ICD-10-CM

## 2019-05-14 DIAGNOSIS — M25.572 LEFT ANKLE PAIN: ICD-10-CM

## 2019-05-14 PROCEDURE — 73610 X-RAY EXAM OF ANKLE: CPT | Mod: LT

## 2019-05-14 PROCEDURE — 99243 OFF/OP CNSLTJ NEW/EST LOW 30: CPT | Performed by: PODIATRIST

## 2019-05-14 NOTE — LETTER
5/14/2019         RE: Katty Dixon  [address]  [place] [address]        Dear Colleague,    Thank you for referring your patient, Katty Dixon, to the [facility]. Please see a copy of my visit note below.    S: Patient seen today in consult from Juju Burger and complains of right lateral ankle pain.  In October 2016 patient had a lateral malleolus ankle fracture with open reduction and internal fixation.  This healed without any complications.  Occasionally she gets pain directly lateral on the ankle and she is wondering if some of the hardware is loose.  She gets occasional discomfort in the lateral ankle gutter as well.  Describes it as a burning pain.  Aggrevated by activity and relieved by rest.  If patient is walking once her feet are warmed up she states it does feel better.  She has occasional post static pain.  Pain intermittent.  She is a smoker.  She denies any pain anywhere else in her feet.    ROS:  A 10-point review of systems was performed and is positive for that noted in the HPI and as seen above.  All other areas are negative.          Allergies   Allergen Reactions     Amoxicillin GI Disturbance     Biaxin [Clarithromycin] GI Disturbance     Hydromorphone GI Disturbance     severe vomiting       Current Outpatient Medications   Medication Sig Dispense Refill     Ascorbic Acid (VITAMIN C PO) Take 1 tablet by mouth daily        cetirizine (ZYRTEC) 10 MG tablet Take 1 tablet by mouth daily. 30 tablet 5     Cholecalciferol (VITAMIN D) 1000 UNITS capsule Take 1 capsule by mouth daily 1-2 doses per day       fluticasone (FLONASE) 50 MCG/ACT nasal spray Spray 1 spray into both nostrils daily as needed for rhinitis or allergies       IBUPROFEN 200 MG OR TABS 400 mg by mouth every four hours as needed       levonorgestrel (MIRENA) 20 MCG/24HR IUD 1 each (20 mcg) by Intrauterine route continuous       rizatriptan (MAXALT) 5 MG tablet Take 1-2 tablets  (5-10 mg) by mouth at onset of headache for migraine May repeat dose in 2 hours.  Do not exceed 30 mg in 24 hours (Patient not taking: Reported on 4/29/2019) 6 tablet 1     tiZANidine (ZANAFLEX) 4 MG tablet        varenicline (CHANTIX DEIRDRE) 0.5 MG X 11 & 1 MG X 42 tablet Take 0.5 mg tab daily for 3 days, THEN 0.5 mg tab twice daily for 4 days, THEN 1 mg twice daily. 53 tablet 0     varenicline (CHANTIX) 1 MG tablet Take 1 tablet (1 mg) by mouth 2 times daily 180 tablet 0     zolpidem (AMBIEN) 5 MG tablet Take 1 tablet (5 mg) by mouth nightly as needed for sleep (Patient not taking: Reported on 4/29/2019) 14 tablet 0       Patient Active Problem List   Diagnosis     Tobacco use disorder     Hypertrophic and atrophic condition of skin     Sinus tarsi syndrome     Pes planovalgus     CARDIOVASCULAR SCREENING; LDL GOAL LESS THAN 160     24 hour contact handout given     Seasonal allergies     Contraception management     Vitamin D deficiency     Migraine     Tension headache     Iron excess     Traumatic blindness of right eye, sequela     Closed displaced fracture of lateral malleolus of left fibula     Ankle pain, left     Mirena IUD- Remove 5/2023     Cervical high risk HPV (human papillomavirus) test positive       Past Medical History:   Diagnosis Date     Cervical high risk HPV (human papillomavirus) test positive 04/29/2019    See problem list     Moderate dysplasia of cervix (VIKY II) 2006    LEEP     Retinal detachment 4/2013    s/p trauma; rupture of globe, vitreous hemorrhage, choroidal effusion, hemorrhage anterior chamber of eye       Past Surgical History:   Procedure Laterality Date     INSERT INTRAUTERINE DEVICE  7/30/2007    out in  7/2012     LEEP TX, CERVICAL  6/2006    VIKY 2     OPEN REDUCTION INTERNAL FIXATION ANKLE Left 10/11/2016    Procedure: OPEN REDUCTION INTERNAL FIXATION ANKLE;  Surgeon: Krunal Aguilar DPM;  Location: [facility]     REPAIR LACERATION CORNEA  4/28/13    right       Family  History   Problem Relation Age of Onset     Cardiomyopathy Mother 67     Depression Father      Cancer Maternal Grandfather         lung     Cancer Paternal Grandmother         lung     C.A.D. No family hx of      Diabetes No family hx of      Hypertension No family hx of      Cerebrovascular Disease No family hx of      Breast Cancer No family hx of      Cancer - colorectal No family hx of        Social History     Tobacco Use     Smoking status: Current Every Day Smoker     Packs/day: 0.50     Years: 18.00     Pack years: 9.00     Types: Cigarettes     Smokeless tobacco: Never Used     Tobacco comment: working on quitting   Substance Use Topics     Alcohol use: Yes     Comment: 2-3 times per week         Exam:    Vitals: /80   Pulse 100   Wt 78.5 kg (173 lb)   BMI 30.85 kg/m     BMI: Body mass index is 30.85 kg/m .  Height: Data Unavailable    Constitutional/ general:  Pt is in no apparent distress, appears well-nourished.  Cooperative with history and physical exam.     Psych:  The patient answered questions appropriately.  Normal affect.  Seems to have reasonable expectations, in terms of treatment.     Eyes:  Visual scanning/ tracking without deficit.     Ears:  Response to auditory stimuli is normal.  negative hearing aid devices.  Auricles in proper alignment.     Lymphatic:  Popliteal lymph nodes not enlarged.     Lungs:  Non labored breathing, non labored speech. No cough.  No audible wheezing. Even, quiet breathing.       Vascular:  positive pedal pulses bilaterally for both the DP and PT arteries.  CFT < 3 sec.  negative ankle edema.  positive pedal hair growth.    Neuro:  Alert and oriented x 3. Coordinated gait.  Light touch sensation is intact to the L4, L5, S1 distributions. No obvious deficits.  No evidence of neurological-based weakness, spasticity, or contracture in the lower extremities.      Derm: Normal texture and turgor.  No erythema, ecchymosis, or cyanosis.      Musculoskeletal:     Lower extremity muscle strength is normal.  Patient is ambulatory without an assistive device or brace.   Pronated arch with weightbearing.  No forefoot or rear foot deformities noted.  Normal ROM all fore foot and rearfoot joints.  No equinus.    No pain with stressing any muscle compartments.  No erythema edema or ecchymosis or masses noted.  Well-healed incision noted over the left lateral malleolus.  There is no pain with palpation here.  I cannot palpate a prominent screw head.  There is no pain with range of motion at all.  Only slight discomfort with palpation of the lateral ankle gutter.    Radiographic Exam:  X-Ray Findings:  I personally reviewed the films.    ANKLE LEFT THREE OR MORE VIEWS May 14, 2019 1:27 PM      HISTORY: Left ankle pain.                                                                      IMPRESSION: Fibular plate and screws. The ankle mortise appears  congruent. The alignment is unchanged from 1/5/2017. No acute fracture  is visible.      A: Left ankle pain status post open reduction internal fixation    P:  X-rays taken today left ankle.  Reassured patient that when compared to previous x-rays the plate is stable and there is no signs of any screws backing out.  I discussed that no obvious signs of arthritis are noted, but I am guessing some of her discomfort especially her post static pain is early arthritis.  Patient will wear good supportive shoes at all times and I discussed that with her pronation it should help offload this area.  I made suggestions.  Patient will take ibuprofen as needed for discomfort.  If ankle pain becomes worse could consider orthotics, physical therapy, or injections.    RETURN TO CLINIC PRN.  Thank you for allowing me participate in the care of this patient.        Dixon Garcia DPM, FACFAS      Again, thank you for allowing me to participate in the care of your patient.        Sincerely,        Dixon Garcia DPM

## 2019-05-14 NOTE — PATIENT INSTRUCTIONS
SMOKING CESSATION  What's in cigarette smoke? - Cigarette smoke contains over 4,000 chemicals. Nicotine is one of the main ingredients which is an insecticide/herbicide. It is poisonous to our nervous system, increases blood clotting risk, and decreases the body's defenses to fight off infection. Another chemical is Carbon Monoxide is an asphyxiating gas that permanently binds to blood cells and blocks the transport of oxygen. This leads to tissue death and decreases your metabolism. Tar is a chemical that coats your lungs and trachea which impairs new oxygen coming in and carbon dioxide getting out of your body.   How does smoking impact surgery? - Smoking is particularly hazardous with regards to surgery. Surgery puts stress on the body and a smoker's body is already under strain from these chemicals. Putting the two together, especially for an elective surgery, could be a recipe for disaster. Smoking before and after surgery increases your risk of heart problems, slow wound healing, delayed bone healing, blood clots, wound infection and anesthesia complications.   What are the benefits of quitting? - In 20 minutes your blood pressure will drop back down to normal. In 8 hours the carbon monoxide (a toxic gas) levels in your blood stream will drop by half, and oxygen levels will return to normal. In 48 hours your chance of having a heart attack will have decreased. All nicotine will have left your body. Your sense of taste and smell will return to a normal level. In 72 hours your bronchial tubes will relax, and your energy levels will increase. In 2 weeks your circulation will increase, and it will continue to improve for the next 10 weeks.    Recommendations for elective surgery - Ideally, patients should quit smoking 8 weeks before and at least 2 weeks after elective surgery in order to avoid complications. Simply cutting back on the amount of cigarettes smoked per day does not offer any benefit or decrease the  risk of poor wound healing, heart problems, and infection. Smokers should also start taking Vitamin C and B for two weeks before surgery and two weeks after surgery.    Ways to Stop Smokin. Nicotine patches, lozenges, or gum  2. Support Groups  3. Medications (see below)    List of Medications:  1. Varenicline Tartrate (CHANTIX)   2. Bupropion HCL (WELLBUTRIN, ZYBAN) - note: make sure Wellbutrin is for smoking cessation and not other issues   3. Nicotine Patch (NICODERM)   4. Nicotine Inhaler (NICOTROL)   5. Nicotine Gum Nicotine Polacrilex   6. Nicotine Lozenge: Nicotine Polacrilex (COMMIT)   * [place] offers a smoking support group as well!  Please visit: [email or web address]  If you are interested in these, ask about getting a prescription or talk to your primary care doctor about what may be the best way for you to quit.       We wish you continued good healing. If you have any questions or concerns, please do not hesitate to contact us at [phone]    Please remember to call and schedule a follow up appointment if one was recommended at your earliest convenience.   PODIATRY CLINIC HOURS  TELEPHONE NUMBER    Dr. Dixon MUELLERPVALENTÍN FAC FAS    Clinics:  [facility]    [facility]   Tuesday 1PM-6PM  [facility] [place]  Wednesday 7AM-2PM  [facility]  Thursday 10AM-6PM  [place]  Friday 7AM-3PM  [place]  Specialty schedulers:   [phone] to make an appointment with any Specialty Provider.        Urgent Care locations:    [facility] Monday-Friday 5 pm - 9 pm. Saturday- 9 am -5pm    Monday-Friday 11 am - 9 pm Saturday 9 am - 5 pm     Monday- 12 noon-8PM [phone]     If you need a medication refill, please contact us you may need lab work and/or a follow up visit prior to your refill (i.e. Antifungal medications).    MyChart  (secure e-mail communication and access to your chart) to send a message or to make an appointment.    If MRI needed please call Sebastien Cruz at [phone]        Weight management plan: Patient was referred to their PCP to discuss a diet and exercise plan.

## 2019-05-16 RX ORDER — VARENICLINE TARTRATE 1 MG/1
1 TABLET, FILM COATED ORAL 2 TIMES DAILY
Qty: 180 TABLET | Refills: 0 | Status: SHIPPED | OUTPATIENT
Start: 2019-05-16 | End: 2019-10-22

## 2019-05-16 NOTE — PROGRESS NOTES
S: Patient seen today in consult from Juju Burger and complains of right lateral ankle pain.  In October 2016 patient had a lateral malleolus ankle fracture with open reduction and internal fixation.  This healed without any complications.  Occasionally she gets pain directly lateral on the ankle and she is wondering if some of the hardware is loose.  She gets occasional discomfort in the lateral ankle gutter as well.  Describes it as a burning pain.  Aggrevated by activity and relieved by rest.  If patient is walking once her feet are warmed up she states it does feel better.  She has occasional post static pain.  Pain intermittent.  She is a smoker.  She denies any pain anywhere else in her feet.    ROS:  A 10-point review of systems was performed and is positive for that noted in the HPI and as seen above.  All other areas are negative.          Allergies   Allergen Reactions     Amoxicillin GI Disturbance     Biaxin [Clarithromycin] GI Disturbance     Hydromorphone GI Disturbance     severe vomiting       Current Outpatient Medications   Medication Sig Dispense Refill     Ascorbic Acid (VITAMIN C PO) Take 1 tablet by mouth daily        cetirizine (ZYRTEC) 10 MG tablet Take 1 tablet by mouth daily. 30 tablet 5     Cholecalciferol (VITAMIN D) 1000 UNITS capsule Take 1 capsule by mouth daily 1-2 doses per day       fluticasone (FLONASE) 50 MCG/ACT nasal spray Spray 1 spray into both nostrils daily as needed for rhinitis or allergies       IBUPROFEN 200 MG OR TABS 400 mg by mouth every four hours as needed       levonorgestrel (MIRENA) 20 MCG/24HR IUD 1 each (20 mcg) by Intrauterine route continuous       rizatriptan (MAXALT) 5 MG tablet Take 1-2 tablets (5-10 mg) by mouth at onset of headache for migraine May repeat dose in 2 hours.  Do not exceed 30 mg in 24 hours (Patient not taking: Reported on 4/29/2019) 6 tablet 1     tiZANidine (ZANAFLEX) 4 MG tablet        varenicline (CHANTIX DEIRDRE) 0.5 MG X 11 & 1 MG X 42  tablet Take 0.5 mg tab daily for 3 days, THEN 0.5 mg tab twice daily for 4 days, THEN 1 mg twice daily. 53 tablet 0     varenicline (CHANTIX) 1 MG tablet Take 1 tablet (1 mg) by mouth 2 times daily 180 tablet 0     zolpidem (AMBIEN) 5 MG tablet Take 1 tablet (5 mg) by mouth nightly as needed for sleep (Patient not taking: Reported on 4/29/2019) 14 tablet 0       Patient Active Problem List   Diagnosis     Tobacco use disorder     Hypertrophic and atrophic condition of skin     Sinus tarsi syndrome     Pes planovalgus     CARDIOVASCULAR SCREENING; LDL GOAL LESS THAN 160     24 hour contact handout given     Seasonal allergies     Contraception management     Vitamin D deficiency     Migraine     Tension headache     Iron excess     Traumatic blindness of right eye, sequela     Closed displaced fracture of lateral malleolus of left fibula     Ankle pain, left     Mirena IUD- Remove 5/2023     Cervical high risk HPV (human papillomavirus) test positive       Past Medical History:   Diagnosis Date     Cervical high risk HPV (human papillomavirus) test positive 04/29/2019    See problem list     Moderate dysplasia of cervix (VIKY II) 2006    LEEP     Retinal detachment 4/2013    s/p trauma; rupture of globe, vitreous hemorrhage, choroidal effusion, hemorrhage anterior chamber of eye       Past Surgical History:   Procedure Laterality Date     INSERT INTRAUTERINE DEVICE  7/30/2007    out in  7/2012     LEEP TX, CERVICAL  6/2006    VIKY 2     OPEN REDUCTION INTERNAL FIXATION ANKLE Left 10/11/2016    Procedure: OPEN REDUCTION INTERNAL FIXATION ANKLE;  Surgeon: Krunal Aguilar DPM;  Location: [facility]     REPAIR LACERATION CORNEA  4/28/13    right       Family History   Problem Relation Age of Onset     Cardiomyopathy Mother 67     Depression Father      Cancer Maternal Grandfather         lung     Cancer Paternal Grandmother         lung     C.A.D. No family hx of      Diabetes No family hx of      Hypertension No family  hx of      Cerebrovascular Disease No family hx of      Breast Cancer No family hx of      Cancer - colorectal No family hx of        Social History     Tobacco Use     Smoking status: Current Every Day Smoker     Packs/day: 0.50     Years: 18.00     Pack years: 9.00     Types: Cigarettes     Smokeless tobacco: Never Used     Tobacco comment: working on quitting   Substance Use Topics     Alcohol use: Yes     Comment: 2-3 times per week         Exam:    Vitals: /80   Pulse 100   Wt 78.5 kg (173 lb)   BMI 30.85 kg/m    BMI: Body mass index is 30.85 kg/m .  Height: Data Unavailable    Constitutional/ general:  Pt is in no apparent distress, appears well-nourished.  Cooperative with history and physical exam.     Psych:  The patient answered questions appropriately.  Normal affect.  Seems to have reasonable expectations, in terms of treatment.     Eyes:  Visual scanning/ tracking without deficit.     Ears:  Response to auditory stimuli is normal.  negative hearing aid devices.  Auricles in proper alignment.     Lymphatic:  Popliteal lymph nodes not enlarged.     Lungs:  Non labored breathing, non labored speech. No cough.  No audible wheezing. Even, quiet breathing.       Vascular:  positive pedal pulses bilaterally for both the DP and PT arteries.  CFT < 3 sec.  negative ankle edema.  positive pedal hair growth.    Neuro:  Alert and oriented x 3. Coordinated gait.  Light touch sensation is intact to the L4, L5, S1 distributions. No obvious deficits.  No evidence of neurological-based weakness, spasticity, or contracture in the lower extremities.      Derm: Normal texture and turgor.  No erythema, ecchymosis, or cyanosis.      Musculoskeletal:    Lower extremity muscle strength is normal.  Patient is ambulatory without an assistive device or brace.   Pronated arch with weightbearing.  No forefoot or rear foot deformities noted.  Normal ROM all fore foot and rearfoot joints.  No equinus.    No pain with  stressing any muscle compartments.  No erythema edema or ecchymosis or masses noted.  Well-healed incision noted over the left lateral malleolus.  There is no pain with palpation here.  I cannot palpate a prominent screw head.  There is no pain with range of motion at all.  Only slight discomfort with palpation of the lateral ankle gutter.    Radiographic Exam:  X-Ray Findings:  I personally reviewed the films.    ANKLE LEFT THREE OR MORE VIEWS May 14, 2019 1:27 PM      HISTORY: Left ankle pain.                                                                      IMPRESSION: Fibular plate and screws. The ankle mortise appears  congruent. The alignment is unchanged from 1/5/2017. No acute fracture  is visible.      A: Left ankle pain status post open reduction internal fixation    P:  X-rays taken today left ankle.  Reassured patient that when compared to previous x-rays the plate is stable and there is no signs of any screws backing out.  I discussed that no obvious signs of arthritis are noted, but I am guessing some of her discomfort especially her post static pain is early arthritis.  Patient will wear good supportive shoes at all times and I discussed that with her pronation it should help offload this area.  I made suggestions.  Patient will take ibuprofen as needed for discomfort.  If ankle pain becomes worse could consider orthotics, physical therapy, or injections.    RETURN TO CLINIC PRN.  Thank you for allowing me participate in the care of this patient.        Dixon Garcia DPM, FACFAS

## 2019-05-17 NOTE — TELEPHONE ENCOUNTER
Juju, do you happen to have these forms complete?    Patient is calling stating they are due tomorrow (5/18).    Thank you,  MUSTAPHA Aguero.

## 2019-09-13 ENCOUNTER — OFFICE VISIT (OUTPATIENT)
Dept: FAMILY MEDICINE | Facility: CLINIC | Age: 44
End: 2019-09-13
Payer: COMMERCIAL

## 2019-09-13 VITALS — SYSTOLIC BLOOD PRESSURE: 108 MMHG | DIASTOLIC BLOOD PRESSURE: 78 MMHG

## 2019-09-13 DIAGNOSIS — F41.8 SITUATIONAL ANXIETY: Primary | ICD-10-CM

## 2019-09-13 DIAGNOSIS — F17.200 TOBACCO DEPENDENCE SYNDROME: ICD-10-CM

## 2019-09-13 PROCEDURE — 99213 OFFICE O/P EST LOW 20 MIN: CPT | Performed by: NURSE PRACTITIONER

## 2019-09-13 RX ORDER — BUPROPION HYDROCHLORIDE 150 MG/1
150 TABLET ORAL EVERY MORNING
Qty: 90 TABLET | Refills: 0 | Status: SHIPPED | OUTPATIENT
Start: 2019-09-13 | End: 2020-11-12

## 2019-09-13 ASSESSMENT — ANXIETY QUESTIONNAIRES
3. WORRYING TOO MUCH ABOUT DIFFERENT THINGS: NEARLY EVERY DAY
GAD7 TOTAL SCORE: 17
6. BECOMING EASILY ANNOYED OR IRRITABLE: SEVERAL DAYS
5. BEING SO RESTLESS THAT IT IS HARD TO SIT STILL: MORE THAN HALF THE DAYS
7. FEELING AFRAID AS IF SOMETHING AWFUL MIGHT HAPPEN: MORE THAN HALF THE DAYS
1. FEELING NERVOUS, ANXIOUS, OR ON EDGE: NEARLY EVERY DAY
2. NOT BEING ABLE TO STOP OR CONTROL WORRYING: NEARLY EVERY DAY
IF YOU CHECKED OFF ANY PROBLEMS ON THIS QUESTIONNAIRE, HOW DIFFICULT HAVE THESE PROBLEMS MADE IT FOR YOU TO DO YOUR WORK, TAKE CARE OF THINGS AT HOME, OR GET ALONG WITH OTHER PEOPLE: SOMEWHAT DIFFICULT

## 2019-09-13 ASSESSMENT — PATIENT HEALTH QUESTIONNAIRE - PHQ9
5. POOR APPETITE OR OVEREATING: NEARLY EVERY DAY
SUM OF ALL RESPONSES TO PHQ QUESTIONS 1-9: 2

## 2019-09-13 NOTE — PROGRESS NOTES
Subjective     Katty Dixon is a 43 year old female who presents to clinic today for the following health issues:    HPI     Abnormal Mood Symptoms  Onset: 1 year but has got worse over last 2 months    Description:   Depression: no   Anxiety: YES    Accompanying Signs & Symptoms:  Still participating in activities that you used to enjoy: YES  Fatigue: no   Irritability: YES  Difficulty concentrating: YES  Changes in appetite: no   Problems with sleep: YES  Heart racing/beating fast : YES  Thoughts of hurting yourself or others: none    History:   Recent stress: YES- work  Prior depression hospitalization: None  Family history of depression: YES -father  Family history of anxiety: YES- mom and grandmother    Precipitating factors:   Alcohol/drug use: YES- occassionally alcohol no drugs    Alleviating factors:  CBD oil    Therapies Tried and outcome: Celexa (Citalopram) tried didn't like how it made her feel and CBD oil currently using - this is helping  But will still get break through   On her FIT BIT her heart rate was  120 bpm for  9 hours - this was  Labor day when this happened   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   She did get a new job - awarded the new job but now they won't let her go from her job.          Patient Active Problem List   Diagnosis     Tobacco use disorder     Hypertrophic and atrophic condition of skin     Sinus tarsi syndrome     Pes planovalgus     CARDIOVASCULAR SCREENING; LDL GOAL LESS THAN 160     24 hour contact handout given     Seasonal allergies     Contraception management     Vitamin D deficiency     Migraine     Tension headache     Iron excess     Traumatic blindness of right eye, sequela     Closed displaced fracture of lateral malleolus of left fibula     Ankle pain, left     Mirena IUD- Remove 5/2023     Cervical high risk HPV (human papillomavirus) test positive     Past Surgical History:   Procedure  Laterality Date     INSERT INTRAUTERINE DEVICE  7/30/2007    out in  7/2012     LEEP TX, CERVICAL  6/2006    VIKY 2     OPEN REDUCTION INTERNAL FIXATION ANKLE Left 10/11/2016    Procedure: OPEN REDUCTION INTERNAL FIXATION ANKLE;  Surgeon: Krunal Aguilar DPM;  Location: [facility]     REPAIR LACERATION CORNEA  4/28/13    right       Social History     Tobacco Use     Smoking status: Current Every Day Smoker     Packs/day: 0.50     Years: 18.00     Pack years: 9.00     Types: Cigarettes     Smokeless tobacco: Never Used     Tobacco comment: working on quitting   Substance Use Topics     Alcohol use: Yes     Comment: 2-3 times per week     Family History   Problem Relation Age of Onset     Cardiomyopathy Mother 67     Depression Father      Cancer Maternal Grandfather         lung     Cancer Paternal Grandmother         lung     C.A.D. No family hx of      Diabetes No family hx of      Hypertension No family hx of      Cerebrovascular Disease No family hx of      Breast Cancer No family hx of      Cancer - colorectal No family hx of          Current Outpatient Medications   Medication Sig Dispense Refill     Ascorbic Acid (VITAMIN C PO) Take 1 tablet by mouth daily        cetirizine (ZYRTEC) 10 MG tablet Take 1 tablet by mouth daily. 30 tablet 5     Cholecalciferol (VITAMIN D) 1000 UNITS capsule Take 1 capsule by mouth daily 1-2 doses per day       fluticasone (FLONASE) 50 MCG/ACT nasal spray Spray 1 spray into both nostrils daily as needed for rhinitis or allergies       IBUPROFEN 200 MG OR TABS 400 mg by mouth every four hours as needed       levonorgestrel (MIRENA) 20 MCG/24HR IUD 1 each (20 mcg) by Intrauterine route continuous       rizatriptan (MAXALT) 5 MG tablet Take 1-2 tablets (5-10 mg) by mouth at onset of headache for migraine May repeat dose in 2 hours.  Do not exceed 30 mg in 24 hours 6 tablet 1     tiZANidine (ZANAFLEX) 4 MG tablet        varenicline (CHANTIX DEIRDRE) 0.5 MG X 11 & 1 MG X 42 tablet Take 0.5  mg tab daily for 3 days, THEN 0.5 mg tab twice daily for 4 days, THEN 1 mg twice daily. (Patient not taking: Reported on 9/13/2019) 53 tablet 0     varenicline (CHANTIX) 1 MG tablet Take 1 tablet (1 mg) by mouth 2 times daily (Patient not taking: Reported on 9/13/2019) 180 tablet 0     zolpidem (AMBIEN) 5 MG tablet Take 1 tablet (5 mg) by mouth nightly as needed for sleep (Patient not taking: Reported on 4/29/2019) 14 tablet 0     Allergies   Allergen Reactions     Amoxicillin GI Disturbance     Biaxin [Clarithromycin] GI Disturbance     Hydromorphone GI Disturbance     severe vomiting     BP Readings from Last 3 Encounters:   09/13/19 108/78   05/14/19 110/80   04/29/19 110/70    Wt Readings from Last 3 Encounters:   05/14/19 78.5 kg (173 lb)   04/29/19 78.6 kg (173 lb 3.2 oz)   07/19/18 78.5 kg (173 lb)                      Reviewed and updated as needed this visit by Provider         Review of Systems   ROS COMP: CONSTITUTIONAL: NEGATIVE for fever, chills, change in weight  ENT/MOUTH: NEGATIVE for ear, mouth and throat problems  RESP: NEGATIVE for significant cough or SOB, is still smoking   CV: NEGATIVE for chest pain, palpitations or peripheral edema and POSITIVE for periods of rapid heart rate.  Usually at work , when she works the evening shift she can have the rapid heart rate from the time she wakes until she is at work ,   On her FIT BIT her heart rate was  115-120bpm for 9 hours at work and during this time she could hear it and feel it   PSYCHIATRIC: POSITIVE for anxiety, concentration difficulty and stress related to her  Job. Has bid out of her job for a new job at the [employer] but they won't let her out of the stressful job   Didn't like the way Celexa made her feel        Objective    /78   There is no height or weight on file to calculate BMI.  Physical Exam   GENERAL: healthy, alert and no distress  NECK: no adenopathy, no asymmetry, masses, or scars and thyroid normal to palpation  RESP:  lungs clear to auscultation - no rales, rhonchi or wheezes,   CV: regular rate and rhythm, normal S1 S2, no S3 or S4, no murmur, click or rub, no peripheral edema and peripheral pulses strong  PSYCH: mentation appears normal, affect normal/bright, judgement and insight intact and appearance well groomed    Diagnostic Test Results:  Labs reviewed in Epic  none         ASSESSMENT/PLAN:      ICD-10-CM    1. Situational anxiety F41.8 buPROPion (WELLBUTRIN XL) 150 MG 24 hr tablet   2. Tobacco dependence syndrome F17.200        Patient Instructions   For the anxiety   -- figure out the  Triggers for your anxiety and then figure out coping mechanisms       I don't need to be anxious about ..... Because .....    Start the Wellbutrin .  This can help with both  Anxiety and smoke stopping       For smoke stopping    Smoke stopping.   You need to set the goal of smoke stopping.  Write your goal on a piece of paper,   I am GOING to quit smoking.  And then  At least 3 positive reasons why you are going to quit smoking.    Place this on the mirror in your bathroom and read it the first thing in the AM and the last thing before bed.  Have your goal posted in the kitchen or where ever you need.  Now read it, say it to yourself at least 8 times per day for  8 weeks.    After a few weeks your brain will start to believe this and it will force you to make the decision to smoke or not to smoke.   Your brain can't remember both  I am going to smoke and I am NOT going to smoke... You choose which one you want to do.     Now, on the 2nd piece of paper write down your rewards for no smoking for 1,2,3,4 weeks,  2,3,4,5,6,8,10 12 months.  Pick someone to give you your rewards so you are accountable to someone and they can also be your cheerleader, ( not a nagger).      Doing this should give you 80 % chance of obtaining your goal !!!     Good luck,     Juju Burger APRN-CNP

## 2019-09-13 NOTE — PATIENT INSTRUCTIONS
For the anxiety   -- figure out the  Triggers for your anxiety and then figure out coping mechanisms       I don't need to be anxious about ..... Because .....    Start the Wellbutrin .  This can help with both  Anxiety and smoke stopping       For smoke stopping    Smoke stopping.   You need to set the goal of smoke stopping.  Write your goal on a piece of paper,   I am GOING to quit smoking.  And then  At least 3 positive reasons why you are going to quit smoking.    Place this on the mirror in your bathroom and read it the first thing in the AM and the last thing before bed.  Have your goal posted in the kitchen or where ever you need.  Now read it, say it to yourself at least 8 times per day for  8 weeks.    After a few weeks your brain will start to believe this and it will force you to make the decision to smoke or not to smoke.   Your brain can't remember both  I am going to smoke and I am NOT going to smoke... You choose which one you want to do.     Now, on the 2nd piece of paper write down your rewards for no smoking for 1,2,3,4 weeks,  2,3,4,5,6,8,10 12 months.  Pick someone to give you your rewards so you are accountable to someone and they can also be your cheerleader, ( not a nagger).      Doing this should give you 80 % chance of obtaining your goal !!!     Good ashleigh,     Juju FERMIN-CNP

## 2019-09-14 ASSESSMENT — ANXIETY QUESTIONNAIRES: GAD7 TOTAL SCORE: 17

## 2019-10-22 ENCOUNTER — TELEPHONE (OUTPATIENT)
Dept: FAMILY MEDICINE | Facility: CLINIC | Age: 44
End: 2019-10-22

## 2019-10-22 DIAGNOSIS — F17.200 TOBACCO DEPENDENCE SYNDROME: ICD-10-CM

## 2019-10-22 RX ORDER — VARENICLINE TARTRATE 1 MG/1
1 TABLET, FILM COATED ORAL 2 TIMES DAILY
Qty: 180 TABLET | Refills: 0 | Status: SHIPPED | OUTPATIENT
Start: 2019-10-22

## 2019-10-22 NOTE — TELEPHONE ENCOUNTER
Prescription approved per [facility].    Cecily Amador RN

## 2019-10-22 NOTE — TELEPHONE ENCOUNTER
Patient wants a script for chantix sent to the mail order pharmacy because she pays nothing for it if she uses that pharmacy.  [facility].    Cecily Howell [facility]

## 2020-07-23 ENCOUNTER — NURSE TRIAGE (OUTPATIENT)
Dept: NURSING | Facility: CLINIC | Age: 45
End: 2020-07-23

## 2020-07-24 NOTE — TELEPHONE ENCOUNTER
Patient says she has developed a rash in her axilla bilaterally.  Patient says rash is spreading out.  Reviewed care advice with caller per RN triage protocol guideline.  Caller verbalized understanding and agrees with plan.        Reason for Disposition    Red, moist, irritated area between skin folds (or under larger breasts)    Additional Information    Negative: [1] Sudden onset of rash (within last 2 hours) AND [2] difficulty with breathing or swallowing    Negative: Sounds like a life-threatening emergency to the triager    Negative: [1] Localized purple or blood-colored spots or dots AND [2] not from injury or friction AND [3] fever    Negative: Patient sounds very sick or weak to the triager    Negative: [1] Red area or streak AND [2] fever    Negative: [1] Rash is painful to touch AND [2] fever    Negative: [1] Looks infected (spreading redness, pus) AND [2] large red area (> 2 in. or 5 cm)    Negative: [1] Looks infected (spreading redness, pus) AND [2] diabetes mellitus or weak immune system (e.g., HIV positive, cancer chemo, splenectomy, organ transplant, chronic steroids)    Negative: [1] Localized purple or blood-colored spots or dots AND [2] not from injury or friction AND [3] no fever    Negative: [1] Looks infected (spreading redness, pus) AND [2] no fever    Negative: Looks like a boil, infected sore, deep ulcer or other infected rash    Negative: [1] Localized rash is very painful AND [2] no fever    Negative: Genital area rash    Negative: Lyme disease suspected (e.g., bull's eye rash or tick bite / exposure)    Negative: [1] Applying cream or ointment AND [2] causes severe itch, burning or pain    Negative: [1] Pimples (localized) AND [2 ] no improvement after using CARE ADVICE    Negative: Tender bumps in armpits    Negative: [1] Severe localized itching AND [2] after 2 days of steroid cream    Negative: Localized rash present > 7 days    Protocols used: RASH OR REDNESS - [id number]

## 2020-07-26 ENCOUNTER — OFFICE VISIT (OUTPATIENT)
Dept: URGENT CARE | Facility: URGENT CARE | Age: 45
End: 2020-07-26
Payer: COMMERCIAL

## 2020-07-26 VITALS
HEART RATE: 93 BPM | OXYGEN SATURATION: 97 % | SYSTOLIC BLOOD PRESSURE: 121 MMHG | DIASTOLIC BLOOD PRESSURE: 82 MMHG | WEIGHT: 177.4 LBS | HEIGHT: 65 IN | TEMPERATURE: 97.1 F | RESPIRATION RATE: 16 BRPM | BODY MASS INDEX: 29.56 KG/M2

## 2020-07-26 DIAGNOSIS — L30.4 INTERTRIGO: Primary | ICD-10-CM

## 2020-07-26 PROCEDURE — 99214 OFFICE O/P EST MOD 30 MIN: CPT | Performed by: FAMILY MEDICINE

## 2020-07-26 RX ORDER — FLUCONAZOLE 150 MG/1
150 TABLET ORAL ONCE
Qty: 2 TABLET | Refills: 0 | Status: SHIPPED | OUTPATIENT
Start: 2020-07-26 | End: 2020-08-07

## 2020-07-26 RX ORDER — NYSTATIN 100000 U/G
CREAM TOPICAL 2 TIMES DAILY
Qty: 30 G | Refills: 0 | Status: SHIPPED | OUTPATIENT
Start: 2020-07-26 | End: 2020-08-05

## 2020-07-26 RX ORDER — CETIRIZINE HYDROCHLORIDE 10 MG/1
10 TABLET ORAL 2 TIMES DAILY PRN
Qty: 30 TABLET | Refills: 0 | Status: SHIPPED | OUTPATIENT
Start: 2020-07-26 | End: 2020-12-16

## 2020-07-26 ASSESSMENT — MIFFLIN-ST. JEOR: SCORE: 1445.56

## 2020-07-26 NOTE — PROGRESS NOTES
"Chief complaint: rash    A week and a half ago had a rash on both axillary area  The past 3 days started getting worse  Patient tried lotrimin - helped the itching and burning   Denies any possibility of pregnancy declined a pregnancy test        Description:   Location: both axillary   Character or description: itchy and burning  Itching (Pruritis): YES    Progression of Symptoms:  worsening    Accompanying Signs & Symptoms:  Fever: no   Body aches or joint pain: no   Sore throat symptoms: no   Recent cold symptoms: no    History:   Previous similar rash: no     Precipitating factors:   Exposure to similar rash: no   New exposures: None   Recent travel: no     Alleviating factors:  above     Therapies Tried and outcome: above       Problem list, Medication list, Allergies, and Medical/Social/Surgical histories [facility] and updated as appropriate.    ROS:  Constitutional, HEENT, cardiovascular, pulmonary, gi and gu systems are negative, except as otherwise noted.    OBJECTIVE:                                                    /82   Pulse 93   Temp 97.1  F (36.2  C) (Tympanic)   Resp 16   Ht 1.651 m (5' 5\")   Wt 80.5 kg (177 lb 6.4 oz)   SpO2 97%   Breastfeeding No   BMI 29.52 kg/m    Body mass index is 29.52 kg/m .  GENERAL: healthy, alert and no distress  Neurologic: Cranial nerves intact no gross neurological deficits  Psych: appropriate mood and affect  MS: no gross musculoskeletal defects noted, no edema  Skin: bilateral erythematous plaques bilateral axillary area with satellite lesions      Diagnostic Test Results:  none      ASSESSMENT/PLAN:                                                    No diagnosis found.      ICD-10-CM    1. Intertrigo  L30.4 nystatin (MYCOSTATIN) 839421 UNIT/GM external cream     fluconazole (DIFLUCAN) 150 MG tablet     cetirizine (ZYRTEC) 10 MG tablet     Prescribed with above  Side effects discussed.  Recommend follow up in clinic or dermatology if no relief " in 5-7 days, sooner if worse  Adverse reactions of medications discussed.  Over the counter medications discussed.   Aware to come back in if with worsening symptoms or if no relief despite treatment plan  Patient voiced understanding and had no further questions.     MD Sangita Kinney MD  [facility]

## 2020-08-04 DIAGNOSIS — L30.4 INTERTRIGO: ICD-10-CM

## 2020-08-04 NOTE — TELEPHONE ENCOUNTER
Reason for Call:  Other prescription    Detailed comments: Pt is calling and still has a rash from being seen on 7/26/20 in [facility].  She is wondering if she can get a refill of the medication below as stating it was such a large area that she is about out of this and the rash is still present.  Phar is [facility]   Disp  Refills  Start  End  ATTILA    nystatin (MYCOSTATIN) [id number] UNIT/GM external cream  30 g  0  7/26/2020   --    Sig - Route: Apply topically 2 times daily For 2 weeks or until 2 days after clear - Topical    Class: Local Print    Order: [id number]    Printout Tracking         Phone Number Patient can be reached at: Home number on file [phone] (home)    Best Time: any    Can we leave a detailed message on this number? YES    Call taken on 8/4/2020 at 10:19 AM by Maryuri Jeong

## 2020-08-05 ENCOUNTER — MYC REFILL (OUTPATIENT)
Dept: FAMILY MEDICINE | Facility: CLINIC | Age: 45
End: 2020-08-05

## 2020-08-05 DIAGNOSIS — L30.4 INTERTRIGO: ICD-10-CM

## 2020-08-05 RX ORDER — NYSTATIN 100000 U/G
CREAM TOPICAL 2 TIMES DAILY
Qty: 30 G | Refills: 0 | Status: SHIPPED | OUTPATIENT
Start: 2020-08-05 | End: 2021-04-19

## 2020-08-05 RX ORDER — NYSTATIN 100000 U/G
CREAM TOPICAL 2 TIMES DAILY
Qty: 30 G | Refills: 0 | Status: CANCELLED | OUTPATIENT
Start: 2020-08-05

## 2020-08-06 ENCOUNTER — E-VISIT (OUTPATIENT)
Dept: FAMILY MEDICINE | Facility: CLINIC | Age: 45
End: 2020-08-06
Payer: COMMERCIAL

## 2020-08-06 DIAGNOSIS — L20.84 INTRINSIC ATOPIC DERMATITIS: Primary | ICD-10-CM

## 2020-08-06 PROCEDURE — 99441 ZZC PHYSICIAN TELEPHONE EVALUATION 5-10 MIN: CPT | Performed by: NURSE PRACTITIONER

## 2020-08-07 ENCOUNTER — TELEPHONE (OUTPATIENT)
Dept: FAMILY MEDICINE | Facility: CLINIC | Age: 45
End: 2020-08-07

## 2020-08-07 ENCOUNTER — NURSE TRIAGE (OUTPATIENT)
Dept: NURSING | Facility: CLINIC | Age: 45
End: 2020-08-07

## 2020-08-07 DIAGNOSIS — L30.4 INTERTRIGO: ICD-10-CM

## 2020-08-07 RX ORDER — NYSTATIN AND TRIAMCINOLONE ACETONIDE 100000; 1 [USP'U]/G; MG/G
OINTMENT TOPICAL 2 TIMES DAILY
Qty: 60 G | Refills: 0 | Status: SHIPPED | OUTPATIENT
Start: 2020-08-07 | End: 2021-04-19

## 2020-08-07 RX ORDER — FLUCONAZOLE 150 MG/1
150 TABLET ORAL ONCE
Qty: 2 TABLET | Refills: 0 | Status: SHIPPED | OUTPATIENT
Start: 2020-08-07 | End: 2020-08-07

## 2020-08-07 NOTE — PATIENT INSTRUCTIONS
Thank you for choosing us for your care. I have placed an order for a prescription so that you can start treatment. View your full visit summary for details by clicking on the link below. Your pharmacist will able to address any questions you may have about the medication.     If you're not feeling better within 5-7 days, please schedule an appointment.  You can schedule an appointment right here in [place], or call [phone]  If the visit is for the same symptoms as your e-visit, we'll refund the cost of your e-visit if seen within seven days.

## 2020-08-07 NOTE — TELEPHONE ENCOUNTER
Provider E-Visit time total (minutes): 5    Please respond to this evisit patient called.    Fredo Gordon [facility]

## 2020-08-07 NOTE — TELEPHONE ENCOUNTER
Was in [facility] two weeks ago with a itchy rash under her armpits, and was given oral Diflucan, which helped a lot and it was almost gone.    Rash has come back, however, and she is waiting to hear back from her e-visit, and is requesting another prescription for Diflucan oral tablets.    Patient would like to use the [facility] in [place], [place].    Please  Call patient at [phone].    Routed to [place] KEENAN Cooper RN  [facility]

## 2020-08-07 NOTE — TELEPHONE ENCOUNTER
Was in [facility] two weeks ago with a itchy rash under her armpits, and was given oral Diflucan, which helped a lot and it was almost gone.    Rash has come back, however, and she is waiting to hear back from her e-visit, and is requesting another prescription for Diflucan oral tablets.    Patient would like to use the [facility] in [place], [place].    Please  Call patient at [phone].    Routed to [facility] Jian Cooper RN  [facility]        Reason for Disposition    Caller requesting a NON-URGENT new prescription or refill and triager unable to refill per unit policy    Protocols used: MEDICATION QUESTION CALL-A-

## 2020-08-10 ENCOUNTER — MYC MEDICAL ADVICE (OUTPATIENT)
Dept: FAMILY MEDICINE | Facility: CLINIC | Age: 45
End: 2020-08-10

## 2020-08-10 DIAGNOSIS — L30.4 INTERTRIGO: Primary | ICD-10-CM

## 2020-08-10 RX ORDER — FLUCONAZOLE 150 MG/1
150 TABLET ORAL DAILY
Qty: 3 TABLET | Refills: 0 | Status: SHIPPED | OUTPATIENT
Start: 2020-08-10 | End: 2020-08-13

## 2020-11-11 ASSESSMENT — ENCOUNTER SYMPTOMS
WEAKNESS: 0
HEADACHES: 1
FEVER: 0
PARESTHESIAS: 0
FREQUENCY: 0
HEARTBURN: 1
COUGH: 0
CONSTIPATION: 0
DYSURIA: 0
NERVOUS/ANXIOUS: 1
EYE PAIN: 0
JOINT SWELLING: 0
MYALGIAS: 0
BREAST MASS: 0
ARTHRALGIAS: 1
NAUSEA: 0
PALPITATIONS: 1
HEMATURIA: 0
ABDOMINAL PAIN: 0
DIZZINESS: 0
SORE THROAT: 0
DIARRHEA: 0
CHILLS: 0
SHORTNESS OF BREATH: 0
HEMATOCHEZIA: 0

## 2020-11-11 ASSESSMENT — PATIENT HEALTH QUESTIONNAIRE - PHQ9
SUM OF ALL RESPONSES TO PHQ QUESTIONS 1-9: 8
10. IF YOU CHECKED OFF ANY PROBLEMS, HOW DIFFICULT HAVE THESE PROBLEMS MADE IT FOR YOU TO DO YOUR WORK, TAKE CARE OF THINGS AT HOME, OR GET ALONG WITH OTHER PEOPLE: SOMEWHAT DIFFICULT
SUM OF ALL RESPONSES TO PHQ QUESTIONS 1-9: 8

## 2020-11-12 ENCOUNTER — OFFICE VISIT (OUTPATIENT)
Dept: FAMILY MEDICINE | Facility: CLINIC | Age: 45
End: 2020-11-12
Payer: COMMERCIAL

## 2020-11-12 VITALS
HEIGHT: 63 IN | HEART RATE: 94 BPM | RESPIRATION RATE: 12 BRPM | TEMPERATURE: 99.1 F | BODY MASS INDEX: 31.36 KG/M2 | SYSTOLIC BLOOD PRESSURE: 122 MMHG | DIASTOLIC BLOOD PRESSURE: 78 MMHG | OXYGEN SATURATION: 97 % | WEIGHT: 177 LBS

## 2020-11-12 DIAGNOSIS — Z11.59 NEED FOR HEPATITIS C SCREENING TEST: ICD-10-CM

## 2020-11-12 DIAGNOSIS — M25.50 MULTIPLE JOINT PAIN: ICD-10-CM

## 2020-11-12 DIAGNOSIS — K21.00 GASTROESOPHAGEAL REFLUX DISEASE WITH ESOPHAGITIS WITHOUT HEMORRHAGE: ICD-10-CM

## 2020-11-12 DIAGNOSIS — R33.8 OTHER RETENTION OF URINE: ICD-10-CM

## 2020-11-12 DIAGNOSIS — M25.431 PAIN AND SWELLING OF RIGHT WRIST: ICD-10-CM

## 2020-11-12 DIAGNOSIS — Z23 NEED FOR VACCINATION: ICD-10-CM

## 2020-11-12 DIAGNOSIS — Z12.4 SCREENING FOR CERVICAL CANCER: ICD-10-CM

## 2020-11-12 DIAGNOSIS — Z00.00 ROUTINE GENERAL MEDICAL EXAMINATION AT A HEALTH CARE FACILITY: Primary | ICD-10-CM

## 2020-11-12 DIAGNOSIS — E55.9 VITAMIN D DEFICIENCY: ICD-10-CM

## 2020-11-12 DIAGNOSIS — M25.531 PAIN AND SWELLING OF RIGHT WRIST: ICD-10-CM

## 2020-11-12 DIAGNOSIS — F41.9 ANXIETY: ICD-10-CM

## 2020-11-12 LAB
ALBUMIN SERPL-MCNC: 3.9 G/DL (ref 3.4–5)
ALP SERPL-CCNC: 76 U/L (ref 40–150)
ALT SERPL W P-5'-P-CCNC: 17 U/L (ref 0–50)
ANION GAP SERPL CALCULATED.3IONS-SCNC: 7 MMOL/L (ref 3–14)
AST SERPL W P-5'-P-CCNC: 15 U/L (ref 0–45)
BILIRUB SERPL-MCNC: 0.3 MG/DL (ref 0.2–1.3)
BUN SERPL-MCNC: 11 MG/DL (ref 7–30)
CALCIUM SERPL-MCNC: 9 MG/DL (ref 8.5–10.1)
CHLORIDE SERPL-SCNC: 109 MMOL/L (ref 94–109)
CO2 SERPL-SCNC: 22 MMOL/L (ref 20–32)
CREAT SERPL-MCNC: 0.67 MG/DL (ref 0.52–1.04)
CRP SERPL-MCNC: <2.9 MG/L (ref 0–8)
GFR SERPL CREATININE-BSD FRML MDRD: >90 ML/MIN/{1.73_M2}
GLUCOSE SERPL-MCNC: 86 MG/DL (ref 70–99)
POTASSIUM SERPL-SCNC: 3.9 MMOL/L (ref 3.4–5.3)
PROT SERPL-MCNC: 7.5 G/DL (ref 6.8–8.8)
SODIUM SERPL-SCNC: 138 MMOL/L (ref 133–144)

## 2020-11-12 PROCEDURE — 36415 COLL VENOUS BLD VENIPUNCTURE: CPT | Performed by: NURSE PRACTITIONER

## 2020-11-12 PROCEDURE — 82306 VITAMIN D 25 HYDROXY: CPT | Performed by: NURSE PRACTITIONER

## 2020-11-12 PROCEDURE — 80053 COMPREHEN METABOLIC PANEL: CPT | Performed by: NURSE PRACTITIONER

## 2020-11-12 PROCEDURE — 87624 HPV HI-RISK TYP POOLED RSLT: CPT | Performed by: NURSE PRACTITIONER

## 2020-11-12 PROCEDURE — 86140 C-REACTIVE PROTEIN: CPT | Performed by: NURSE PRACTITIONER

## 2020-11-12 PROCEDURE — G0145 SCR C/V CYTO,THINLAYER,RESCR: HCPCS | Performed by: NURSE PRACTITIONER

## 2020-11-12 PROCEDURE — 99396 PREV VISIT EST AGE 40-64: CPT | Performed by: NURSE PRACTITIONER

## 2020-11-12 RX ORDER — CITALOPRAM HYDROBROMIDE 20 MG/1
20 TABLET ORAL DAILY
Qty: 30 TABLET | Refills: 1 | Status: SHIPPED | OUTPATIENT
Start: 2020-11-12 | End: 2021-01-14

## 2020-11-12 ASSESSMENT — PATIENT HEALTH QUESTIONNAIRE - PHQ9: SUM OF ALL RESPONSES TO PHQ QUESTIONS 1-9: 8

## 2020-11-12 ASSESSMENT — MIFFLIN-ST. JEOR: SCORE: 1403.03

## 2020-11-12 NOTE — PROGRESS NOTES
SUBJECTIVE:   CC: Katty Dixon is an 45 year old woman who presents for preventive health visit.     Patient has been advised of split billing requirements and indicates understanding: Yes     Answers for HPI/ROS submitted by the patient on 11/11/2020   Annual Exam:  Frequency of exercise:: None  Getting at least 3 servings of Calcium per day:: NO  Diet:: Regular (no restrictions)  Taking medications regularly:: Yes  Medication side effects:: Not applicable  Bi-annual eye exam:: Yes  Dental care twice a year:: NO  Sleep apnea or symptoms of sleep apnea:: Daytime drowsiness  abdominal pain: No  Blood in stool: No  Blood in urine: No  chest pain: No  chills: No  congestion: No  constipation: No  cough: No  diarrhea: No  dizziness: No  ear pain: No  eye pain: No  nervous/anxious: Yes  fever: No  frequency: No  genital sores: No  headaches: Yes  hearing loss: No  heartburn: Yes  arthralgias: Yes  joint swelling: No  peripheral edema: No  mood changes: Yes  myalgias: No  nausea: No  dysuria: No  palpitations: Yes  Skin sensation changes: No  sore throat: No  urgency: No  rash: No  shortness of breath: No  visual disturbance: No  weakness: No  pelvic pain: No  vaginal bleeding: No  vaginal discharge: No  tenderness: No  breast mass: No  breast discharge: No  Additional concerns today:: Yes  If you checked off any problems, how difficult have these problems made it for you to do your work, take care of things at home, or get along with other people?: Somewhat difficult,  Is feeling that her anxiety and depression is getting worse    She is super tired and hard to get motivated,  Or lays in bed and thinks . l Mother and grandmother also have /had  Anxiety    PHQ9 TOTAL SCORE: 8    Joint Pain    Onset: 1 day- awoke with pain  When she went to bed her right wrist was normal      Description:   Location: right wrist  Character: Sharp,stiff and shooting    Intensity: moderate    Progression of Symptoms:  same    Accompanying Signs & Symptoms:  Other symptoms: numbness, warmth, swelling and redness    History:   Previous similar pain: no       Precipitating factors:   Trauma or overuse: no     Alleviating factors:  Improved by: nothing    Therapies Tried and outcome: None    *Right hand dominant    *Urinary issues      Today's PHQ-2 Score:   PHQ-2 ( 1999 Pfizer) 11/11/2020 9/13/2019   Q1: Little interest or pleasure in doing things 2 0   Q2: Feeling down, depressed or hopeless 1 0   PHQ-2 Score 3 0   Q1: Little interest or pleasure in doing things More than half the days -   Q2: Feeling down, depressed or hopeless Several days -   PHQ-2 Score 3 -     Abuse: Current or Past(Physical, Sexual or Emotional)- No  Do you feel safe in your environment? Yes    Social History     Tobacco Use     Smoking status: Current Every Day Smoker     Packs/day: 0.50     Years: 18.00     Pack years: 9.00     Types: Cigarettes     Smokeless tobacco: Never Used     Tobacco comment: working on quitting   Substance Use Topics     Alcohol use: Yes     Comment: 2-3 times per week     If you drink alcohol do you typically have >3 drinks per day or >7 drinks per week? No                     Reviewed orders with patient.  Reviewed health maintenance and updated orders accordingly - Yes  BP Readings from Last 3 Encounters:   11/12/20 122/78   07/26/20 121/82   09/13/19 108/78    Wt Readings from Last 3 Encounters:   11/12/20 80.3 kg (177 lb)   07/26/20 80.5 kg (177 lb 6.4 oz)   05/14/19 78.5 kg (173 lb)         Mammogram Screening: Patient under age 50, mutual decision reflected in health maintenance.      Pertinent mammograms are reviewed under the imaging tab.  History of abnormal Pap smear: NO - age 30-65 PAP every 5 years with negative HPV co-testing recommended  PAP / HPV Latest Ref Rng & Units 4/29/2019 4/21/2016 1/8/2014   PAP - NIL NIL NIL   HPV 16 DNA NEG:Negative Negative Negative -   HPV 18 DNA NEG:Negative Negative Negative -   OTHER  "HR HPV NEG:Negative Positive(A) Negative -     Reviewed and updated as needed this visit by clinical staff  Tobacco  Allergies  Meds   Med Hx  Surg Hx  Fam Hx  Soc Hx     Ally DeShong CMA    Reviewed and updated as needed this visit by Provider                    ROS:  CONSTITUTIONAL: NEGATIVE for fever, chills, change in weight  INTEGUMENTARU/SKIN: NEGATIVE for worrisome rashes, moles or lesions  EYES: NEGATIVE for vision changes or irritation and current with eye exam  Does wear glasses   ENT: NEGATIVE for ear, mouth and throat problems and due for dental   RESP: NEGATIVE for significant cough or SOB  BREAST: NEGATIVE for masses, tenderness or discharge  CV: NEGATIVE for chest pain, palpitations or peripheral edema  GI: NEGATIVE for nausea, abdominal pain,  or change in bowel habits and POSITIVE for heartburn or reflux, can drink water and have reflux.  The OTC medication doesn't help    female: IUD did have some spotting when urinating \" something will shut off and she won't be able to urinate,\"  Sometimes she will need to pee in the shower    MUSCULOSKELETAL:POSITIVE  for joint pain right  Wrist pain .  Is getting worse.  No injury or trauma.  Painful to move her wrist.  It would hurt to lift something . Woke with the right wrist pain today   No reason for the pain  . No hx of arthritis .  Does have increased joint pain with age . Paternal grandmother did have contracted hands/arthritis    NEURO: NEGATIVE for weakness, dizziness or paresthesias and Hx headaches-musculoskeletal   The headaches are getting worse    ENDOCRINE: NEGATIVE for temperature intolerance, skin/hair changes  HEME/ALLERGY/IMMUNE: NEGATIVE for bleeding problems  PSYCHIATRIC: POSITIVE for anxiety that is getting worse.    Did quit her job at [employer] due to stress. But does still have stress.    Unsure what is stressing her out.      OBJECTIVE:   /78   Pulse 94   Temp 99.1  F (37.3  C) (Tympanic)   Resp 12   Ht 1.594 m " "(5' 2.75\")   Wt 80.3 kg (177 lb)   LMP  (LMP Unknown)   SpO2 97%   Breastfeeding No   BMI 31.60 kg/m    EXAM:  GENERAL: healthy, alert and no distress  EYES: Eyes grossly normal to inspection, PERRL and conjunctivae and sclerae normal  HENT: normal cephalic/atraumatic, right ear: does have some scarring , left ear: normal: no effusions, no erythema, normal landmarks, nose and mouth without ulcers or lesions, oropharynx clear, oral mucous membranes moist   NECK: no adenopathy, no asymmetry, masses, or scars and thyroid normal to palpation  RESP: lungs clear to auscultation - no rales, rhonchi or wheezes  BREAST: normal without masses, tenderness or nipple discharge and no palpable axillary masses or adenopathy  CV: regular rate and rhythm, normal S1 S2, no S3 or S4, no murmur, click or rub, no peripheral edema and peripheral pulses strong  ABDOMEN: soft, nontender, no hepatosplenomegaly, no masses and bowel sounds normal   (female): normal female external genitalia, normal urethral meatus , vaginal mucosa pink, moist, well rugated and cervix is difficult to find    MS: decreased range of motion with the right wrist.  No bruising but there is swelling and a tightness and very tender  On the martinez side beneath the thumb.  Hand grasp with the right hand is painful, and weak    Right hand is warm, color is pink and nailbeds are pink with normal capillary refill    SKIN: no suspicious lesions or rashes  NEURO: Normal strength and tone, mentation intact and speech normal  PSYCH: mentation appears normal, affect normal/bright  LYMPH: no cervical, supraclavicular, axillary, or inguinal adenopathy    Diagnostic Test Results:  Labs reviewed in Epic  Pending     ASSESSMENT/PLAN:     ASSESSMENT/PLAN:      ICD-10-CM    1. Routine general medical examination at a health care facility  Z00.00 Comprehensive metabolic panel   2. Screening for cervical cancer  Z12.4 Pap imaged thin layer screen with HPV - recommended age 30 - " 65 years (select HPV order below)     HPV High Risk Types DNA Cervical   3. Need for hepatitis C screening test  Z11.59    4. Need for vaccination  Z23    5. Gastroesophageal reflux disease with esophagitis without hemorrhage  K21.00 GASTROENTEROLOGY ADULT REF CONSULT ONLY     Comprehensive metabolic panel   6. Multiple joint pain  M25.50 CRP, inflammation   7. Other retention of urine  R33.8 UROLOGY ADULT REFERRAL   8. Pain and swelling of right wrist  M25.531 Wrist/Arm/Hand Supplies Order for DME - ONLY FOR DME    M25.431    9. Vitamin D deficiency  E55.9 Vitamin D Deficiency   10. Anxiety  F41.9 citalopram (CELEXA) 20 MG tablet       Patient Instructions     Preventive Health Recommendations  Female Ages 40 to 49    Yearly exam:     See your health care provider every year in order to  1. Review health changes.   2. Discuss preventive care.    3. Review your medicines if your doctor prescribed any.      Get a Pap test every three years (unless you have an abnormal result and your provider advises testing more often).      If you get Pap tests with HPV test, you only need to test every 5 years, unless you have an abnormal result. You do not need a Pap test if your uterus was removed (hysterectomy) and you have not had cancer.      You should be tested each year for STDs (sexually transmitted diseases), if you're at risk.     Ask your doctor if you should have a mammogram.      Have a colonoscopy (test for colon cancer) if someone in your family has had colon cancer or polyps before age 50.       Have a cholesterol test every 5 years.       Have a diabetes test (fasting glucose) after age 45. If you are at risk for diabetes, you should have this test every 3 years.    Shots: Get a flu shot each year. Get a tetanus shot every 10 years.     Nutrition:     Eat at least 5 servings of fruits and vegetables each day.    Eat whole-grain bread, whole-wheat pasta and brown rice instead of white grains and rice.    Get  "adequate Calcium and Vitamin D.      Lifestyle    Exercise at least 150 minutes a week (an average of 30 minutes a day, 5 days a week). This will help you control your weight and prevent disease.    Limit alcohol to one drink per day.    No smoking.     Wear sunscreen to prevent skin cancer.    See your dentist every six months for an exam and cleaning.      Wear the wrist brace during the day   - you can take it off when awake ,   But do wear it during the night /sleeping  If getting worse let me know.   Massage in [place] cream   Soak in Epson salt bath     For the anxiety try  Celexa       You do have a referral to  Urology and  GI                 Patient has been advised of split billing requirements and indicates understanding: Yes  COUNSELING:   Reviewed preventive health counseling, as reflected in patient instructions       Regular exercise       Healthy diet/nutrition    Estimated body mass index is 31.6 kg/m  as calculated from the following:    Height as of this encounter: 1.594 m (5' 2.75\").    Weight as of this encounter: 80.3 kg (177 lb).    Weight management plan: Discussed healthy diet and exercise guidelines    She reports that she has been smoking cigarettes. She has a 9.00 pack-year smoking history. She has never used smokeless tobacco.  Tobacco Cessation Action Plan:         Counseling Resources:  ATP IV Guidelines  Pooled Cohorts Equation Calculator  Breast Cancer Risk Calculator  BRCA-Related Cancer Risk Assessment: FHS-7 Tool  FRAX Risk Assessment  ICSI Preventive Guidelines  Dietary Guidelines for Americans, 2010  USDA's MyPlate  ASA Prophylaxis  Lung CA Screening    HU ROCA NP, ZANDER KRAMER  [facility]  "

## 2020-11-13 ENCOUNTER — TELEPHONE (OUTPATIENT)
Dept: UROLOGY | Facility: CLINIC | Age: 45
End: 2020-11-13

## 2020-11-13 LAB — DEPRECATED CALCIDIOL+CALCIFEROL SERPL-MC: 25 UG/L (ref 20–75)

## 2020-11-13 NOTE — LETTER
November 30, 2020      Katty Dixon  [address] [place] [address]        Dear Katty Dixon,    We have been unable to reach you by phone (or Ze-genhart) to schedule your consult with urology. Please call your clinic or use [facility] to make an appointment with your provider before you run out of medication.  Please let us know if you have any questions and we would be happy to help.     Thank you for trusting us with your care.    Sincerely,     [facility]  [phone]

## 2020-11-13 NOTE — TELEPHONE ENCOUNTER
[facility] Call Center    Phone Message    May a detailed message be left on voicemail: yes     Reason for Call: Appointment Intake    Referring Provider Name: Juju Burger APRN CNP  Diagnosis and/or Symptoms: retention of urine; when urinating it feels like the bladder shuts off, will have to pee in the shower at times.    Referral/records are in system for review. Please advise and contact patient directly to schedule! Thanks!    Action Taken: Message routed to:  Adult Clinics: Urology p [phone]    Travel Screening: Not Applicable

## 2020-11-16 NOTE — TELEPHONE ENCOUNTER
Message sent to urology  with request to contact patient to schedule next available with Dr. Keen.    Nuzhat Robison RN, BSN

## 2020-11-16 NOTE — TELEPHONE ENCOUNTER
Left message with my direct contact phone number for patient to call back and schedule:  Please schedule with Dr. Keen next available on 12/3/20 at 9:00am or 11:30am. In-person visit is okay.       Lia Enciso  Surgical Specialties Procedure   [facility] [place]  11/16/2020 11:28 AM

## 2020-11-17 LAB
COPATH REPORT: NORMAL
PAP: NORMAL

## 2020-11-19 ENCOUNTER — PATIENT OUTREACH (OUTPATIENT)
Dept: FAMILY MEDICINE | Facility: CLINIC | Age: 45
End: 2020-11-19

## 2020-11-19 NOTE — TELEPHONE ENCOUNTER
1/11/06 [facility] pap.  Colpo 1/26/06 with area of VIKY II and negative ECC.  Referred for LEEP  LEEP 06/06--VIKY 2, margins clear--Pap Q 4 months x 3  10/4/06:Pap--NIL  3/15/07:Pap--NIL  9/7/07:Pap--NIL  4/22/08:Pap--NIL  12/18/09:Pap--NIL  11/22/10:Pap--NIL  9/20/12:Pap--NIL  1/10/14:Pap--NIL  4/21/16 NIL pap, Neg HPV  4/29/19 NIL pap, + HR HPV (not 16 or 18). Plan 1 year cotest  9/30/20 Reminder My Chart--Not Read  11/12/20 NIL Pap, + HR HPV (neg 16/18). [place] due by 2/12/2021.   11/19/20 Message left to return call.

## 2020-11-23 NOTE — TELEPHONE ENCOUNTER
2nd attempt to schedule as noted below. Message left for patient to return call and schedule.    Lia Enciso  Surgical Specialties Procedure   [facility] [place]  11/23/2020 9:53 AM

## 2020-11-30 NOTE — TELEPHONE ENCOUNTER
3rd attempt. Patient has not called to schedule.  Appointment reminder letter sent to patient.      Lia Enciso  Surgical Specialties Procedure   [facility] [place]  11/30/2020 9:16 AM

## 2020-12-16 ENCOUNTER — OFFICE VISIT (OUTPATIENT)
Dept: OBGYN | Facility: CLINIC | Age: 45
End: 2020-12-16
Payer: COMMERCIAL

## 2020-12-16 VITALS
BODY MASS INDEX: 31.55 KG/M2 | WEIGHT: 178.1 LBS | TEMPERATURE: 98 F | DIASTOLIC BLOOD PRESSURE: 76 MMHG | SYSTOLIC BLOOD PRESSURE: 117 MMHG | HEIGHT: 63 IN | HEART RATE: 90 BPM

## 2020-12-16 DIAGNOSIS — B97.7 HPV IN FEMALE: Primary | ICD-10-CM

## 2020-12-16 PROCEDURE — 88305 TISSUE EXAM BY PATHOLOGIST: CPT | Performed by: PATHOLOGY

## 2020-12-16 PROCEDURE — 57454 BX/CURETT OF CERVIX W/SCOPE: CPT | Performed by: OBSTETRICS & GYNECOLOGY

## 2020-12-16 ASSESSMENT — MIFFLIN-ST. JEOR: SCORE: 1404.05

## 2020-12-16 NOTE — PROGRESS NOTES
Colposcopy/LEEP    Date/Time: 12/16/2020 10:07 AM  Performed by: Yuriy Wilkinson MD  Authorized by: Yuriy Wilkinson MD     Consent:     Consent obtained:  Verbal and written    Consent given by:  Patient    Procedural risks discussed:  Bleeding and repeat procedure    Patient questions answered: yes      Patient agrees, verbalizes understanding, and wants to proceed: yes      Educational handouts given: no      Instructions and paperwork completed: yes    Pre-procedure:     Pre-procedure timeout performed: yes      Premeds:  Ibuprofen    Prepped with: acetic acid and Lugol      Local anesthetic:  Lidocaine 1% W/O epi  Indication:     Indication:  HPV    HPV Indications:  Other high risk  Procedure:     Procedure: Colposcopy w/ cervical biopsy and ECC      Under satisfactory analgesia the patient was prepped and draped in the dorsal lithotomy position: yes      [place]t speculum was placed in the vagina: yes      Under colposcopic examination the transition zone was seen in entirety: yes      Intracervical block was performed: yes      Endocervix was curetted using a Kevorkian curette: yes      Cervical biopsy performed with a cervical biopsy punch: yes      Tampon inserted: no      Monsel's solution was applied: yes      Biopsy(s): yes      Location:  Biopsies @ 12:00  & 1:00    Specimen to pathology: yes    Post-procedure:     Findings: White epithelium      Impression: Low grade cervical dysplasia      Patient tolerance of procedure:  Patient tolerated the procedure well with no immediate complications  Comments:      Extensive AWE      I discussed the issue of vaccinating her adult children.

## 2020-12-18 LAB — COPATH REPORT: NORMAL

## 2020-12-19 NOTE — RESULT ENCOUNTER NOTE
Please inform Katty of her normal results.  Recommend Co-testing in 1 year  Yuriy Wilkinson MD

## 2020-12-20 ENCOUNTER — PATIENT OUTREACH (OUTPATIENT)
Dept: OBGYN | Facility: CLINIC | Age: 45
End: 2020-12-20

## 2020-12-20 NOTE — TELEPHONE ENCOUNTER
1/11/06 [facility] pap.  Colpo 1/26/06 with area of VIKY II and negative ECC.  Referred for LEEP  LEEP 06/06--VIKY 2, margins clear--Pap Q 4 months x 3  10/4/06:Pap--NIL  3/15/07:Pap--NIL  9/7/07:Pap--NIL  4/22/08:Pap--NIL  12/18/09:Pap--NIL  11/22/10:Pap--NIL  9/20/12:Pap--NIL  1/10/14:Pap--NIL  4/21/16 NIL pap, Neg HPV  4/29/19 NIL pap, + HR HPV (not 16 or 18). Plan 1 year cotest  9/30/20 Reminder My Chart--Not Read  11/12/20 NIL Pap, + HR HPV (neg 16/18). [place] due by 2/12/2021.   11/19/20. Pt notified by phone.   12/16/20 COLP- Atypia. NO dysplasia found. ECC- Negative for dysplasia.  Plan cotest in 1 year due bef 12/16/21.

## 2021-01-12 DIAGNOSIS — F41.9 ANXIETY: ICD-10-CM

## 2021-01-14 RX ORDER — CITALOPRAM HYDROBROMIDE 20 MG/1
20 TABLET ORAL DAILY
Qty: 30 TABLET | Refills: 0 | Status: SHIPPED | OUTPATIENT
Start: 2021-01-14 | End: 2021-02-17

## 2021-02-12 DIAGNOSIS — F41.9 ANXIETY: ICD-10-CM

## 2021-02-16 NOTE — TELEPHONE ENCOUNTER
Routing refill request to provider for review/approval because:  Drug interaction warning  JC-7 SCORE 9/20/2012 9/13/2019   Total Score 0 -   Total Score - 17

## 2021-02-17 RX ORDER — CITALOPRAM HYDROBROMIDE 20 MG/1
20 TABLET ORAL DAILY
Qty: 30 TABLET | Refills: 0 | Status: SHIPPED | OUTPATIENT
Start: 2021-02-17 | End: 2021-03-22

## 2021-03-14 ENCOUNTER — HEALTH MAINTENANCE LETTER (OUTPATIENT)
Age: 46
End: 2021-03-14

## 2021-03-19 DIAGNOSIS — F41.9 ANXIETY: ICD-10-CM

## 2021-03-21 NOTE — TELEPHONE ENCOUNTER
Routing refill request to provider for review/approval because:  Drug interaction warning      Bambi France RN

## 2021-03-22 RX ORDER — CITALOPRAM HYDROBROMIDE 20 MG/1
TABLET ORAL
Qty: 30 TABLET | Refills: 0 | Status: SHIPPED | OUTPATIENT
Start: 2021-03-22 | End: 2021-04-19

## 2021-04-19 ENCOUNTER — VIRTUAL VISIT (OUTPATIENT)
Dept: FAMILY MEDICINE | Facility: CLINIC | Age: 46
End: 2021-04-19
Payer: COMMERCIAL

## 2021-04-19 DIAGNOSIS — Z12.31 ENCOUNTER FOR SCREENING MAMMOGRAM FOR BREAST CANCER: Primary | ICD-10-CM

## 2021-04-19 DIAGNOSIS — J30.2 SEASONAL ALLERGIES: ICD-10-CM

## 2021-04-19 DIAGNOSIS — F41.9 ANXIETY: ICD-10-CM

## 2021-04-19 PROCEDURE — 99213 OFFICE O/P EST LOW 20 MIN: CPT | Mod: 95 | Performed by: NURSE PRACTITIONER

## 2021-04-19 RX ORDER — CITALOPRAM HYDROBROMIDE 20 MG/1
20 TABLET ORAL DAILY
Qty: 90 TABLET | Refills: 1 | Status: SHIPPED | OUTPATIENT
Start: 2021-04-19

## 2021-04-19 RX ORDER — HYDROXYZINE HYDROCHLORIDE 25 MG/1
TABLET, FILM COATED ORAL
Qty: 60 TABLET | Refills: 1 | Status: SHIPPED | OUTPATIENT
Start: 2021-04-19

## 2021-04-19 RX ORDER — CETIRIZINE HYDROCHLORIDE 10 MG/1
10 TABLET ORAL DAILY
Qty: 90 TABLET | Refills: 1 | Status: SHIPPED | OUTPATIENT
Start: 2021-04-19

## 2021-04-19 ASSESSMENT — PATIENT HEALTH QUESTIONNAIRE - PHQ9
5. POOR APPETITE OR OVEREATING: SEVERAL DAYS
SUM OF ALL RESPONSES TO PHQ QUESTIONS 1-9: 2

## 2021-04-19 ASSESSMENT — ENCOUNTER SYMPTOMS
NERVOUS/ANXIOUS: 1
RHINORRHEA: 1
SHORTNESS OF BREATH: 0
SLEEP DISTURBANCE: 1
CHEST TIGHTNESS: 1
EYE DISCHARGE: 1
EYE ITCHING: 1
PALPITATIONS: 0

## 2021-04-19 ASSESSMENT — ANXIETY QUESTIONNAIRES
2. NOT BEING ABLE TO STOP OR CONTROL WORRYING: SEVERAL DAYS
1. FEELING NERVOUS, ANXIOUS, OR ON EDGE: MORE THAN HALF THE DAYS
IF YOU CHECKED OFF ANY PROBLEMS ON THIS QUESTIONNAIRE, HOW DIFFICULT HAVE THESE PROBLEMS MADE IT FOR YOU TO DO YOUR WORK, TAKE CARE OF THINGS AT HOME, OR GET ALONG WITH OTHER PEOPLE: SOMEWHAT DIFFICULT
GAD7 TOTAL SCORE: 7
7. FEELING AFRAID AS IF SOMETHING AWFUL MIGHT HAPPEN: SEVERAL DAYS
6. BECOMING EASILY ANNOYED OR IRRITABLE: SEVERAL DAYS
3. WORRYING TOO MUCH ABOUT DIFFERENT THINGS: SEVERAL DAYS
5. BEING SO RESTLESS THAT IT IS HARD TO SIT STILL: NOT AT ALL

## 2021-04-19 NOTE — PATIENT INSTRUCTIONS
You can call imaging scheduling to set up appointment date, time, and location for your mammogram that works best for you. [phone]     Call and schedule with urology

## 2021-04-19 NOTE — PROGRESS NOTES
Cecily is a 45 year old who is being evaluated via a billable video visit.      How would you like to obtain your AVS? MyChart  If the video visit is dropped, the invitation should be resent by: Text to cell phone: [phone]  Will anyone else be joining your video visit? No  Video Start Time: 11:20 AM    Assessment & Plan     Anxiety  Stable-doing well on current dose of citalopram.  We will plan to add hydroxyzine as needed for breakthrough anxiety and sleep difficulty.  We will plan to follow-up in 6 months or sooner if needed  - hydrOXYzine (ATARAX) 25 MG tablet; Take 1 tab every 6 hours as needed for anxiety. May repeat after 30-60 minutes if needed. Do not take with alcohol. No driving after taking.  - citalopram (CELEXA) 20 MG tablet; Take 1 tablet (20 mg) by mouth daily    Encounter for screening mammogram for breast cancer  Order placed, plans to call per self and set up  - MA Screening Digital Bilateral; Future    Seasonal allergies  Doing well on current.  Refills given.  - cetirizine (ZYRTEC) 10 MG tablet; Take 1 tablet (10 mg) by mouth daily    Review of external notes as documented elsewhere in note  Review of the result(s) of each unique test - Vitamin D, lipid, CMP, Pap  19 minutes spent on the date of the encounter doing chart review, history and exam, documentation and further activities per the note       Return in about 6 months (around 10/19/2021) for A Physical Exam with fasting labs, A Mood Check.    ZANDER Avendaño [facility] [place] Tony is a 45 year old who presents for the following health issues     HPI     Anxiety Follow-Up    How are you doing with your anxiety since your last visit? Improved but has breakthrough anxiety. Would like to discuss adding additional anxiety medication as needed.     Are you having other symptoms that might be associated with anxiety? Yes:  trouble sleeping    Have you had a significant life event? No     Are you feeling  depressed? No    Do you have any concerns with your use of alcohol or other drugs? No    Social History     Tobacco Use     Smoking status: Current Every Day Smoker     Packs/day: 0.50     Years: 18.00     Pack years: 9.00     Types: Cigarettes     Smokeless tobacco: Never Used     Tobacco comment: working on quitting   Substance Use Topics     Alcohol use: Yes     Comment: 2-3 times per week     Drug use: No     JC-7 SCORE 9/20/2012 9/13/2019   Total Score 0 -   Total Score - 17     PHQ 9/13/2019 11/11/2020   PHQ-9 Total Score 2 8   Q9: Thoughts of better off dead/self-harm past 2 weeks Not at all Not at all     Last PHQ-9 4/19/2021   1.  Little interest or pleasure in doing things 0   2.  Feeling down, depressed, or hopeless 0   3.  Trouble falling or staying asleep, or sleeping too much 1   4.  Feeling tired or having little energy 1   5.  Poor appetite or overeating 0   6.  Feeling bad about yourself 0   7.  Trouble concentrating 0   8.  Moving slowly or restless 0   Q9: Thoughts of better off dead/self-harm past 2 weeks 0   PHQ-9 Total Score 2   Difficulty at work, home, or with people Somewhat difficult     JC-7  4/19/2021   1. Feeling nervous, anxious, or on edge 2   2. Not being able to stop or control worrying 1   3. Worrying too much about different things 1   4. Trouble relaxing 1   5. Being so restless that it is hard to sit still 0   6. Becoming easily annoyed or irritable 1   7. Feeling afraid, as if something awful might happen 1   JC-7 Total Score 7   If you checked any problems, how difficult have they made it for you to do your work, take care of things at home, or get along with other people? Somewhat difficult       Medication Followup of Zyrtec 10 mg    Taking Medication as prescribed: yes    Side Effects:  None    Medication Helping Symptoms:  yes       Video visit completed due to COVID 19 outbreak.     Needs to have refill of allergy medication, Zyrtec. Normnally just takes in spring and  fall.  During that time will take it daily. Eyes are itching and drining, scratchy throat. If is ouside and is windy will have some break through symptoms.  Overall, works very well.    Needs to have a refill of citalopram.  Feels like it is working well to control anxiety.  Just feels like occasionally has break through anxiety. Prior to starting the medication was not able to focus and felt fulttering in chest and heaviness to chest. Will get break thorough  anxiety a couple of times per week. WIll last hours at times. Will try to do breathing exercises and that does help at times.  Has been to counseling/therapy in the past.  Is occasionally having trouble sleeping. Will be a couple of nights a week. Does have nights that is not able to shut brain off.     Review of Systems   HENT: Positive for congestion and rhinorrhea.    Eyes: Positive for discharge and itching.   Respiratory: Positive for chest tightness (heaviness). Negative for shortness of breath.    Cardiovascular: Negative for palpitations.   Psychiatric/Behavioral: Positive for sleep disturbance. The patient is nervous/anxious.           Objective           Vitals:  No vitals were obtained today due to virtual visit.    Physical Exam  Constitutional:       Appearance: Normal appearance.   HENT:      Nose: No congestion.   Eyes:      General: Lids are normal.   Pulmonary:      Effort: No tachypnea, bradypnea or respiratory distress.   Skin:     Coloration: Skin is not ashen, cyanotic, jaundiced or pale.   Neurological:      Mental Status: She is alert.   Psychiatric:         Mood and Affect: Mood normal.         Speech: Speech normal.         Behavior: Behavior normal.              Video-Visit Details    Type of service:  Video Visit    Video End Time:11:31 AM    Originating Location (pt. Location): Home    Distant Location (provider location):  [facility] [place]     Platform used for Video Visit: [employer]

## 2021-04-20 ASSESSMENT — ANXIETY QUESTIONNAIRES: GAD7 TOTAL SCORE: 7

## 2021-04-28 ENCOUNTER — ANCILLARY PROCEDURE (OUTPATIENT)
Dept: MAMMOGRAPHY | Facility: CLINIC | Age: 46
End: 2021-04-28
Attending: NURSE PRACTITIONER
Payer: COMMERCIAL

## 2021-04-28 DIAGNOSIS — Z12.31 ENCOUNTER FOR SCREENING MAMMOGRAM FOR BREAST CANCER: ICD-10-CM

## 2021-04-28 PROCEDURE — 77067 SCR MAMMO BI INCL CAD: CPT | Mod: GC

## 2021-05-13 NOTE — TELEPHONE ENCOUNTER
Patient has been scheduled.      Lia Enciso  Surgical Specialties Procedure   [employer] [place]  11/17/2020 8:33 AM

## 2021-05-13 NOTE — TELEPHONE ENCOUNTER
Jade Maxwell LPN  [facility] Procedure Coordinator 2 hours ago (10:23 AM)     Hey guys,     Can you please help patient schedule next available Urologist?   Thanks!!!

## 2021-05-13 NOTE — TELEPHONE ENCOUNTER
[facility] Call Center    Phone Message    May a detailed message be left on voicemail: yes     Reason for Call: The patient called to schedule urology referral for Other retention of urine [R33.8]. Please advise. Thank you.    Action Taken: Message routed to:  Adult Clinics: Urology p [phone]    Travel Screening: Not Applicable

## 2021-10-24 ENCOUNTER — HEALTH MAINTENANCE LETTER (OUTPATIENT)
Age: 46
End: 2021-10-24

## 2021-12-02 ENCOUNTER — PATIENT OUTREACH (OUTPATIENT)
Dept: FAMILY MEDICINE | Facility: CLINIC | Age: 46
End: 2021-12-02
Payer: COMMERCIAL

## 2021-12-02 DIAGNOSIS — R87.810 CERVICAL HIGH RISK HPV (HUMAN PAPILLOMAVIRUS) TEST POSITIVE: ICD-10-CM

## 2021-12-19 ENCOUNTER — HEALTH MAINTENANCE LETTER (OUTPATIENT)
Age: 46
End: 2021-12-19

## 2022-01-31 PROBLEM — R87.810 CERVICAL HIGH RISK HPV (HUMAN PAPILLOMAVIRUS) TEST POSITIVE: Status: ACTIVE | Noted: 2019-04-29

## 2022-02-01 ENCOUNTER — PATIENT OUTREACH (OUTPATIENT)
Dept: FAMILY MEDICINE | Facility: CLINIC | Age: 47
End: 2022-02-01
Payer: COMMERCIAL

## 2022-02-01 NOTE — TELEPHONE ENCOUNTER
12/29/21 hysterectomy. Benign cervix. Through [employer]  01/27/22 postop visit with OB/Gyn plan follow up visit in 1 year due 12/29/22. ([employer])

## 2022-07-31 ENCOUNTER — HEALTH MAINTENANCE LETTER (OUTPATIENT)
Age: 47
End: 2022-07-31

## 2022-10-15 ENCOUNTER — HEALTH MAINTENANCE LETTER (OUTPATIENT)
Age: 47
End: 2022-10-15

## 2023-03-26 ENCOUNTER — HEALTH MAINTENANCE LETTER (OUTPATIENT)
Age: 48
End: 2023-03-26

## 2023-08-20 ENCOUNTER — HEALTH MAINTENANCE LETTER (OUTPATIENT)
Age: 48
End: 2023-08-20

## 2024-05-26 ENCOUNTER — HEALTH MAINTENANCE LETTER (OUTPATIENT)
Age: 49
End: 2024-05-26